# Patient Record
Sex: FEMALE | Race: WHITE | Employment: FULL TIME | URBAN - METROPOLITAN AREA
[De-identification: names, ages, dates, MRNs, and addresses within clinical notes are randomized per-mention and may not be internally consistent; named-entity substitution may affect disease eponyms.]

---

## 2017-03-02 ENCOUNTER — OFFICE VISIT (OUTPATIENT)
Dept: FAMILY MEDICINE | Facility: CLINIC | Age: 42
End: 2017-03-02
Payer: COMMERCIAL

## 2017-03-02 VITALS
HEART RATE: 64 BPM | DIASTOLIC BLOOD PRESSURE: 76 MMHG | HEIGHT: 63 IN | BODY MASS INDEX: 31.5 KG/M2 | TEMPERATURE: 99.1 F | SYSTOLIC BLOOD PRESSURE: 126 MMHG | WEIGHT: 177.8 LBS

## 2017-03-02 DIAGNOSIS — F43.22 ADJUSTMENT DISORDER WITH ANXIETY: ICD-10-CM

## 2017-03-02 DIAGNOSIS — N89.8 VAGINAL DISCHARGE: ICD-10-CM

## 2017-03-02 DIAGNOSIS — M62.830 BACK MUSCLE SPASM: ICD-10-CM

## 2017-03-02 DIAGNOSIS — Z13.6 CARDIOVASCULAR SCREENING; LDL GOAL LESS THAN 160: ICD-10-CM

## 2017-03-02 DIAGNOSIS — R53.83 FATIGUE, UNSPECIFIED TYPE: ICD-10-CM

## 2017-03-02 DIAGNOSIS — N92.0 EXCESSIVE OR FREQUENT MENSTRUATION: ICD-10-CM

## 2017-03-02 DIAGNOSIS — Z01.419 ENCOUNTER FOR GYNECOLOGICAL EXAMINATION WITHOUT ABNORMAL FINDING: Primary | ICD-10-CM

## 2017-03-02 DIAGNOSIS — E05.90 HYPERTHYROIDISM: ICD-10-CM

## 2017-03-02 LAB
ANION GAP SERPL CALCULATED.3IONS-SCNC: 7 MMOL/L (ref 3–14)
BUN SERPL-MCNC: 15 MG/DL (ref 7–30)
CALCIUM SERPL-MCNC: 8.7 MG/DL (ref 8.5–10.1)
CHLORIDE SERPL-SCNC: 104 MMOL/L (ref 94–109)
CO2 SERPL-SCNC: 27 MMOL/L (ref 20–32)
CREAT SERPL-MCNC: 0.66 MG/DL (ref 0.52–1.04)
GFR SERPL CREATININE-BSD FRML MDRD: NORMAL ML/MIN/1.7M2
GLUCOSE SERPL-MCNC: 83 MG/DL (ref 70–99)
HGB BLD-MCNC: 12.2 G/DL (ref 11.7–15.7)
MICRO REPORT STATUS: NORMAL
POTASSIUM SERPL-SCNC: 4.1 MMOL/L (ref 3.4–5.3)
SODIUM SERPL-SCNC: 138 MMOL/L (ref 133–144)
SPECIMEN SOURCE: NORMAL
TSH SERPL DL<=0.005 MIU/L-ACNC: 1.31 MU/L (ref 0.4–4)
WET PREP SPEC: NORMAL

## 2017-03-02 PROCEDURE — 99396 PREV VISIT EST AGE 40-64: CPT | Performed by: NURSE PRACTITIONER

## 2017-03-02 PROCEDURE — 87624 HPV HI-RISK TYP POOLED RSLT: CPT | Performed by: NURSE PRACTITIONER

## 2017-03-02 PROCEDURE — 85018 HEMOGLOBIN: CPT | Performed by: NURSE PRACTITIONER

## 2017-03-02 PROCEDURE — 80048 BASIC METABOLIC PNL TOTAL CA: CPT | Performed by: NURSE PRACTITIONER

## 2017-03-02 PROCEDURE — 84443 ASSAY THYROID STIM HORMONE: CPT | Performed by: NURSE PRACTITIONER

## 2017-03-02 PROCEDURE — 36415 COLL VENOUS BLD VENIPUNCTURE: CPT | Performed by: NURSE PRACTITIONER

## 2017-03-02 PROCEDURE — 87210 SMEAR WET MOUNT SALINE/INK: CPT | Performed by: NURSE PRACTITIONER

## 2017-03-02 PROCEDURE — G0123 SCREEN CERV/VAG THIN LAYER: HCPCS | Performed by: NURSE PRACTITIONER

## 2017-03-02 RX ORDER — SERTRALINE HYDROCHLORIDE 25 MG/1
25 TABLET, FILM COATED ORAL DAILY
Qty: 90 TABLET | Refills: 1 | Status: CANCELLED | OUTPATIENT
Start: 2017-03-02

## 2017-03-02 RX ORDER — CYCLOBENZAPRINE HCL 10 MG
10 TABLET ORAL
Qty: 30 TABLET | Refills: 1 | Status: SHIPPED | OUTPATIENT
Start: 2017-03-02 | End: 2017-11-20

## 2017-03-02 ASSESSMENT — ANXIETY QUESTIONNAIRES
1. FEELING NERVOUS, ANXIOUS, OR ON EDGE: NOT AT ALL
6. BECOMING EASILY ANNOYED OR IRRITABLE: NOT AT ALL
3. WORRYING TOO MUCH ABOUT DIFFERENT THINGS: SEVERAL DAYS
5. BEING SO RESTLESS THAT IT IS HARD TO SIT STILL: NOT AT ALL
7. FEELING AFRAID AS IF SOMETHING AWFUL MIGHT HAPPEN: NOT AT ALL
2. NOT BEING ABLE TO STOP OR CONTROL WORRYING: NOT AT ALL
GAD7 TOTAL SCORE: 1

## 2017-03-02 ASSESSMENT — PATIENT HEALTH QUESTIONNAIRE - PHQ9: 5. POOR APPETITE OR OVEREATING: NOT AT ALL

## 2017-03-02 NOTE — PATIENT INSTRUCTIONS
Preventive Health Recommendations  Female Ages 40 to 49    Yearly exam:     See your health care provider every year in order to  1. Review health changes.   2. Discuss preventive care.    3. Review your medicines if your doctor prescribed any.      Get a Pap test every three years (unless you have an abnormal result and your provider advises testing more often).      If you get Pap tests with HPV test, you only need to test every 5 years, unless you have an abnormal result. You do not need a Pap test if your uterus was removed (hysterectomy) and you have not had cancer.      You should be tested each year for STDs (sexually transmitted diseases), if you're at risk.       Ask your doctor if you should have a mammogram.      Have a colonoscopy (test for colon cancer) if someone in your family has had colon cancer or polyps before age 50.       Have a cholesterol test every 5 years.       Have a diabetes test (fasting glucose) after age 45. If you are at risk for diabetes, you should have this test every 3 years.    Shots: Get a flu shot each year. Get a tetanus shot every 10 years.     Nutrition:     Eat at least 5 servings of fruits and vegetables each day.    Eat whole-grain bread, whole-wheat pasta and brown rice instead of white grains and rice.    Talk to your provider about Calcium and Vitamin D.     Lifestyle    Exercise at least 150 minutes a week (an average of 30 minutes a day, 5 days a week). This will help you control your weight and prevent disease.    Limit alcohol to one drink per day.    No smoking.     Wear sunscreen to prevent skin cancer.    See your dentist every six months for an exam and cleaning.      Increased your Zoloft to   50 mg    Let me know if that does isn't working for you    Stay well hydrated - urine should be clear.        Exercise   30-60 min daily

## 2017-03-02 NOTE — NURSING NOTE
"Chief Complaint   Patient presents with     Physical       Initial /76 (BP Location: Left arm, Patient Position: Chair, Cuff Size: Adult Regular)  Pulse 64  Temp 99.1  F (37.3  C) (Tympanic)  Ht 5' 3.25\" (1.607 m)  Wt 177 lb 12.8 oz (80.6 kg)  LMP 02/11/2017 (Exact Date)  Breastfeeding? No  BMI 31.25 kg/m2 Estimated body mass index is 31.25 kg/(m^2) as calculated from the following:    Height as of this encounter: 5' 3.25\" (1.607 m).    Weight as of this encounter: 177 lb 12.8 oz (80.6 kg).  Medication Reconciliation: complete     Cherelle Smith CMA (AAMA)      "

## 2017-03-02 NOTE — MR AVS SNAPSHOT
After Visit Summary   3/2/2017    Reymundo Mari Page    MRN: [id number]           Patient Information     Date Of Birth          1975        Visit Information        Provider Department      3/2/2017 3:40 PM Sofie Burgos APRN [facility]        Today's Diagnoses     Encounter for gynecological examination without abnormal finding    -  1    Adjustment disorder with anxiety        Back muscle spasm        Hyperthyroidism        CARDIOVASCULAR SCREENING; LDL GOAL LESS THAN 160        Fatigue, unspecified type        Excessive or frequent menstruation          Care Instructions      Preventive Health Recommendations  Female Ages 40 to 49    Yearly exam:     See your health care provider every year in order to  1. Review health changes.   2. Discuss preventive care.    3. Review your medicines if your doctor prescribed any.      Get a Pap test every three years (unless you have an abnormal result and your provider advises testing more often).      If you get Pap tests with HPV test, you only need to test every 5 years, unless you have an abnormal result. You do not need a Pap test if your uterus was removed (hysterectomy) and you have not had cancer.      You should be tested each year for STDs (sexually transmitted diseases), if you're at risk.       Ask your doctor if you should have a mammogram.      Have a colonoscopy (test for colon cancer) if someone in your family has had colon cancer or polyps before age 50.       Have a cholesterol test every 5 years.       Have a diabetes test (fasting glucose) after age 45. If you are at risk for diabetes, you should have this test every 3 years.    Shots: Get a flu shot each year. Get a tetanus shot every 10 years.     Nutrition:     Eat at least 5 servings of fruits and vegetables each day.    Eat whole-grain bread, whole-wheat pasta and brown rice instead of white grains and rice.    Talk to your provider about  "Calcium and Vitamin D.     Lifestyle    Exercise at least 150 minutes a week (an average of 30 minutes a day, 5 days a week). This will help you control your weight and prevent disease.    Limit alcohol to one drink per day.    No smoking.     Wear sunscreen to prevent skin cancer.    See your dentist every six months for an exam and cleaning.      Increased your Zoloft to   50 mg    Let me know if that does isn't working for you    Stay well hydrated - urine should be clear.        Exercise   30-60 min daily             Follow-ups after your visit        Who to contact     Normal or non-critical lab and imaging results will be communicated to you by Fliptuhart, letter or phone within 4 business days after the clinic has received the results. If you do not hear from us within 7 days, please contact the clinic through 3TIER or phone. If you have a critical or abnormal lab result, we will notify you by phone as soon as possible.  Submit refill requests through 3TIER or call your pharmacy and they will forward the refill request to us. Please allow 3 business days for your refill to be completed.          If you need to speak with a  for additional information , please call: [phone]           Additional Information About Your Visit        3TIER Information     3TIER gives you secure access to your electronic health record. If you see a primary care provider, you can also send messages to your care team and make appointments. If you have questions, please call your primary care clinic.  If you do not have a primary care provider, please call [phone] and they will assist you.        Care EveryWhere ID     This is your Care EveryWhere ID. This could be used by other organizations to access your [facility] medical records  [id number]        Your Vitals Were     Pulse Temperature Height Last Period Breastfeeding? BMI (Body Mass Index)    64 99.1  F (37.3  C) (Tympanic) 5' 3.25\" (1.607 m) " 02/11/2017 (Exact Date) No 31.25 kg/m2       Blood Pressure from Last 3 Encounters:   03/02/17 126/76   03/14/16 128/74   02/17/16 126/74    Weight from Last 3 Encounters:   03/02/17 177 lb 12.8 oz (80.6 kg)   03/14/16 156 lb 6.4 oz (70.9 kg)   02/17/16 152 lb (68.9 kg)              We Performed the Following     Basic metabolic panel  (Ca, Cl, CO2, Creat, Gluc, K, Na, BUN)     GLUCOSE     Hemoglobin     HPV High Risk Types DNA Cervical     Pap imaged thin layer screen with HPV - recommended age 30 - 65     TSH with free T4 reflex          Today's Medication Changes          These changes are accurate as of: 3/2/17  4:21 PM.  If you have any questions, ask your nurse or doctor.               These medicines have changed or have updated prescriptions.        Dose/Directions    sertraline 50 MG tablet   Commonly known as:  ZOLOFT   This may have changed:    - medication strength  - how much to take  - additional instructions   Used for:  Adjustment disorder with anxiety   Changed by:  Sofie Burgos APRN CNP        Dose:  50 mg   Take 1 tablet (50 mg) by mouth daily   Quantity:  90 tablet   Refills:  1            Where to get your medicines      These medications were sent to [facility] [id number] [facility] [place] - [address]  [address] [place] [address]     Phone:  [phone]     cyclobenzaprine 10 MG tablet    sertraline 50 MG tablet                Primary Care Provider Office Phone # Fax #    MELANY Gonzalez [facility] [phone]       [facility] [address] [place] [address]        Thank you!     Thank you for choosing [facility]  for your care. Our goal is always to provide you with excellent care. Hearing back from our patients is one way we can continue to improve our services. Please take a few minutes to complete the written survey that you may receive in the mail after your visit with us. Thank you!             Your Updated  Medication List - Protect others around you: Learn how to safely use, store and throw away your medicines at www.disposemymeds.org.          This list is accurate as of: 3/2/17  4:21 PM.  Always use your most recent med list.                   Brand Name Dispense Instructions for use    cyclobenzaprine 10 MG tablet    FLEXERIL    30 tablet    Take 1 tablet (10 mg) by mouth nightly as needed for muscle spasms       ibuprofen 200 MG tablet    ADVIL/MOTRIN     Take 200 mg by mouth Reported on 3/2/2017       MULTIVITAMIN ADULT PO          sertraline 50 MG tablet    ZOLOFT    90 tablet    Take 1 tablet (50 mg) by mouth daily       VITAMIN D (CHOLECALCIFEROL) PO      Take 2,000 Units by mouth daily

## 2017-03-02 NOTE — PROGRESS NOTES
SUBJECTIVE:     CC: Reymundo Joseph is an 41 year old woman who presents for preventive health visit.   Answers for HPI/ROS submitted by the patient on 2/27/2017   Annual Exam:  Getting at least 3 servings of Calcium per day:: Yes  Bi-annual eye exam:: Yes  Dental care twice a year:: Yes  Sleep apnea or symptoms of sleep apnea:: None  Diet:: Regular (no restrictions)  Frequency of exercise:: 2-3 days/week  Taking medications regularly:: Yes  Medication side effects:: None  Additional concerns today:: No  PHQ-2 Depressed: Not at all, Not at all  PHQ-2 Score: 0  Duration of exercise:: 15-30 minutes    *No health care concerns.    Today's PHQ-2 Score:   PHQ-2 ( 1999 [employer]) 2/27/2017 2/17/2016   Q1: Little interest or pleasure in doing things - 0   Q2: Feeling down, depressed or hopeless - 0   PHQ-2 Score - 0   Little interest or pleasure in doing things Not at all -   Feeling down, depressed or hopeless Not at all -   PHQ-2 Score 0 -     Abuse: Current or Past(Physical, Sexual or Emotional)- No  Do you feel safe in your environment - Yes    Social History   Substance Use Topics     Smoking status: Former Smoker     Packs/day: 0.50     Types: Cigarettes     Quit date: 5/18/2006     Smokeless tobacco: Former User     Alcohol use 1.5 - 2.0 oz/week     The patient does not drink >3 drinks per day nor >7 drinks per week.    Recent Labs   Lab Test  02/09/15   0753  06/05/12   0853   CHOL  165  154   HDL  66  53   LDL  86  91   TRIG  64  49   CHOLHDLRATIO  2.5  3.0       Reviewed orders with patient.  Reviewed health maintenance and updated orders accordingly - Yes    Mammo Decision Support:  Patient under age 50, mutual decision reflected in health maintenance.      Pertinent mammograms are reviewed under the imaging tab.  History of abnormal Pap smear: NO - age 30- 65 PAP every 3 years recommended    Reviewed and updated as needed this visit by clinical staff  Tobacco  Allergies  Meds  Med Hx  Surg Hx  Fam  Hx  Soc Hx        Reviewed and updated as needed this visit by Provider      would like to increase the zoloft to 50 mg        ROS:  C: NEGATIVE for fever, chills, change in weight  I: NEGATIVE for worrisome rashes, moles or lesions  E: NEGATIVE for vision changes or irritation  ENT: NEGATIVE for ear, mouth and throat problems  R: NEGATIVE for significant cough or SOB  B: NEGATIVE for masses, tenderness or discharge  CV: NEGATIVE for chest pain, palpitations or peripheral edema  GI: NEGATIVE for nausea, abdominal pain, heartburn, or change in bowel habits  : NEGATIVE for unusual urinary or vaginal symptoms. Periods are regular.  M: NEGATIVE for significant arthralgias or myalgia  N: NEGATIVE for weakness, dizziness or paresthesias  E: NEGATIVE for temperature intolerance, skin/hair changes  H: NEGATIVE for bleeding problems  PSYCHIATRIC: POSITIVE for anxiety is pretty well controlled     Labs reviewed in EPIC  BP Readings from Last 3 Encounters:   17 126/76   16 128/74   16 126/74    Wt Readings from Last 3 Encounters:   17 177 lb 12.8 oz (80.6 kg)   16 156 lb 6.4 oz (70.9 kg)   16 152 lb (68.9 kg)                  Patient Active Problem List   Diagnosis     Hyperthyroidism     CARDIOVASCULAR SCREENING; LDL GOAL LESS THAN 160     ASC-H on pap smear     Family history of other condition     Adjustment disorder with anxiety     24 hour contact info given     Over weight     Vitamin D deficiency     Past Surgical History   Procedure Laterality Date     C nonspecific procedure  1984     Tonsillectomy      section  2011     Procedure: SECTION; Surgeon:CHIKSI CLINE; Location: L+D       Social History   Substance Use Topics     Smoking status: Former Smoker     Packs/day: 0.50     Types: Cigarettes     Quit date: 2006     Smokeless tobacco: Former User     Alcohol use 1.5 - 2.0 oz/week     Family History   Problem Relation Age of Onset      "Neurologic Disorder Mother      migraines, headaches     Thyroid Disease Mother      Hypertension Mother      Lipids Mother      +     Thyroid Disease Maternal Grandmother      CANCER Maternal Grandmother      Psychotic Disorder Maternal Grandmother      Depression Maternal Grandmother      HEART DISEASE Maternal Grandfather      Hypertension Maternal Grandfather      Thyroid Disease Sister      OSTEOPOROSIS Sister          Current Outpatient Prescriptions   Medication Sig Dispense Refill     cyclobenzaprine (FLEXERIL) 10 MG tablet Take 1 tablet (10 mg) by mouth nightly as needed for muscle spasms 30 tablet 1     sertraline (ZOLOFT) 50 MG tablet Take 1 tablet (50 mg) by mouth daily 90 tablet 1     Multiple Vitamins-Minerals (MULTIVITAMIN ADULT PO)        VITAMIN D, CHOLECALCIFEROL, PO Take 2,000 Units by mouth daily       ibuprofen (ADVIL,MOTRIN) 200 MG tablet Take 200 mg by mouth Reported on 3/2/2017       [DISCONTINUED] sertraline (ZOLOFT) 25 MG tablet Take 1 tablet (25 mg) by mouth daily DUE ffor  office f/u in FEB 2017 90 tablet 1     Allergies   Allergen Reactions     No Known Drug Allergies      OBJECTIVE:     /76 (BP Location: Left arm, Patient Position: Chair, Cuff Size: Adult Regular)  Pulse 64  Temp 99.1  F (37.3  C) (Tympanic)  Ht 5' 3.25\" (1.607 m)  Wt 177 lb 12.8 oz (80.6 kg)  LMP 02/11/2017 (Exact Date)  Breastfeeding? No  BMI 31.25 kg/m2  EXAM:  GENERAL: healthy, alert and no distress  EYES: Eyes grossly normal to inspection, PERRL and conjunctivae and sclerae normal  HENT: ear canals and TM's normal, nose and mouth without ulcers or lesions  NECK: no adenopathy, no asymmetry, masses, or scars and thyroid normal to palpation  RESP: lungs clear to auscultation - no rales, rhonchi or wheezes  BREAST: normal without masses, tenderness or nipple discharge and no palpable axillary masses or adenopathy  CV: regular rate and rhythm, normal S1 S2, no S3 or S4, no murmur, click or rub, no peripheral " edema and peripheral pulses strong  ABDOMEN: soft, nontender, no hepatosplenomegaly, no masses and bowel sounds normal   (female): normal female external genitalia, normal urethral meatus, vaginal mucosa pink, moist, well rugated, and normal cervix/adnexa/uterus without masses or discharge   (female): normal female external genitalia, normal urethral meatus , vaginal mucosa pink, moist, well rugated, normal cervix, adnexae, and uterus without masses. and does have a lot of  Mucous   MS: no gross musculoskeletal defects noted, no edema  SKIN: no suspicious lesions or rashes  NEURO: Normal strength and tone, mentation intact and speech normal  PSYCH: mentation appears normal, affect normal/bright  LYMPH: no cervical, supraclavicular, axillary, or inguinal adenopathy    ASSESSMENT/PLAN:       ASSESSMENT/PLAN:      ICD-10-CM    1. Encounter for gynecological examination without abnormal finding Z01.419 Pap imaged thin layer screen with HPV - recommended age 30 - 65     HPV High Risk Types DNA Cervical     Basic metabolic panel  (Ca, Cl, CO2, Creat, Gluc, K, Na, BUN)     CANCELED: GLUCOSE   2. Adjustment disorder with anxiety F43.22 sertraline (ZOLOFT) 50 MG tablet   3. Back muscle spasm M62.830 cyclobenzaprine (FLEXERIL) 10 MG tablet   4. Hyperthyroidism E05.90 TSH with free T4 reflex   5. CARDIOVASCULAR SCREENING; LDL GOAL LESS THAN 160 Z13.6    6. Fatigue, unspecified type R53.83 Basic metabolic panel  (Ca, Cl, CO2, Creat, Gluc, K, Na, BUN)   7. Excessive or frequent menstruation N92.0 Hemoglobin   8. Vaginal discharge N89.8 Wet prep       Patient Instructions     Preventive Health Recommendations  Female Ages 40 to 49    Yearly exam:     See your health care provider every year in order to  1. Review health changes.   2. Discuss preventive care.    3. Review your medicines if your doctor prescribed any.      Get a Pap test every three years (unless you have an abnormal result and your provider advises testing  "more often).      If you get Pap tests with HPV test, you only need to test every 5 years, unless you have an abnormal result. You do not need a Pap test if your uterus was removed (hysterectomy) and you have not had cancer.      You should be tested each year for STDs (sexually transmitted diseases), if you're at risk.       Ask your doctor if you should have a mammogram.      Have a colonoscopy (test for colon cancer) if someone in your family has had colon cancer or polyps before age 50.       Have a cholesterol test every 5 years.       Have a diabetes test (fasting glucose) after age 45. If you are at risk for diabetes, you should have this test every 3 years.    Shots: Get a flu shot each year. Get a tetanus shot every 10 years.     Nutrition:     Eat at least 5 servings of fruits and vegetables each day.    Eat whole-grain bread, whole-wheat pasta and brown rice instead of white grains and rice.    Talk to your provider about Calcium and Vitamin D.     Lifestyle    Exercise at least 150 minutes a week (an average of 30 minutes a day, 5 days a week). This will help you control your weight and prevent disease.    Limit alcohol to one drink per day.    No smoking.     Wear sunscreen to prevent skin cancer.    See your dentist every six months for an exam and cleaning.      Increased your Zoloft to   50 mg    Let me know if that does isn't working for you    Stay well hydrated - urine should be clear.        Exercise   30-60 min daily                 COUNSELING:   Reviewed preventive health counseling, as reflected in patient instructions       Regular exercise       Healthy diet/nutrition       Vision screening         reports that she quit smoking about 10 years ago. Her smoking use included Cigarettes. She smoked 0.50 packs per day. She has quit using smokeless tobacco.    Estimated body mass index is 31.25 kg/(m^2) as calculated from the following:    Height as of this encounter: 5' 3.25\" (1.607 m).    Weight " as of this encounter: 177 lb 12.8 oz (80.6 kg).   Weight management plan: Discussed healthy diet and exercise guidelines and patient will follow up in 6 months in clinic to re-evaluate.    Counseling Resources:  ATP IV Guidelines  Pooled Cohorts Equation Calculator  Breast Cancer Risk Calculator  FRAX Risk Assessment  ICSI Preventive Guidelines  Dietary Guidelines for Americans, 2010  USDA's MyPlate  ASA Prophylaxis  Lung CA Screening    HOSSEIN RODRIGUEZ NP, APRN CNP  [facility]

## 2017-03-03 ASSESSMENT — PATIENT HEALTH QUESTIONNAIRE - PHQ9: SUM OF ALL RESPONSES TO PHQ QUESTIONS 1-9: 3

## 2017-03-03 ASSESSMENT — ANXIETY QUESTIONNAIRES: GAD7 TOTAL SCORE: 1

## 2017-03-07 LAB
COPATH REPORT: NORMAL
PAP: NORMAL

## 2017-03-08 LAB
FINAL DIAGNOSIS: NORMAL
HPV HR 12 DNA CVX QL NAA+PROBE: NEGATIVE
HPV16 DNA SPEC QL NAA+PROBE: NEGATIVE
HPV18 DNA SPEC QL NAA+PROBE: NEGATIVE
SPECIMEN DESCRIPTION: NORMAL

## 2017-08-30 DIAGNOSIS — F43.22 ADJUSTMENT DISORDER WITH ANXIETY: ICD-10-CM

## 2017-09-27 ENCOUNTER — ALLIED HEALTH/NURSE VISIT (OUTPATIENT)
Dept: NURSING | Facility: CLINIC | Age: 42
End: 2017-09-27
Payer: COMMERCIAL

## 2017-09-27 DIAGNOSIS — Z23 NEED FOR PROPHYLACTIC VACCINATION AND INOCULATION AGAINST INFLUENZA: Primary | ICD-10-CM

## 2017-09-27 PROCEDURE — 90686 IIV4 VACC NO PRSV 0.5 ML IM: CPT

## 2017-09-27 PROCEDURE — 99207 ZZC NO CHARGE NURSE ONLY: CPT

## 2017-09-27 PROCEDURE — 90471 IMMUNIZATION ADMIN: CPT

## 2017-09-27 NOTE — MR AVS SNAPSHOT
After Visit Summary   9/27/2017    Reymundo Mari Page    MRN: [id number]           Patient Information     Date Of Birth          1975        Visit Information        Provider Department      9/27/2017 3:00 PM CARE COORDINATOR [facility]        Today's Diagnoses     Need for prophylactic vaccination and inoculation against influenza    -  1       Follow-ups after your visit        Who to contact     If you have questions or need follow up information about today's clinic visit or your schedule please contact [facility] directly at [phone].  Normal or non-critical lab and imaging results will be communicated to you by Amity Manufacturinghart, letter or phone within 4 business days after the clinic has received the results. If you do not hear from us within 7 days, please contact the clinic through Ecochlort or phone. If you have a critical or abnormal lab result, we will notify you by phone as soon as possible.  Submit refill requests through Electronifie or call your pharmacy and they will forward the refill request to us. Please allow 3 business days for your refill to be completed.          Additional Information About Your Visit        MyChart Information     Electronifie gives you secure access to your electronic health record. If you see a primary care provider, you can also send messages to your care team and make appointments. If you have questions, please call your primary care clinic.  If you do not have a primary care provider, please call [phone] and they will assist you.        Care EveryWhere ID     This is your Care EveryWhere ID. This could be used by other organizations to access your [facility] medical records  [id number]         Blood Pressure from Last 3 Encounters:   03/02/17 126/76   03/14/16 128/74   02/17/16 126/74    Weight from Last 3 Encounters:   03/02/17 177 lb 12.8 oz (80.6 kg)   03/14/16 156 lb 6.4 oz (70.9 kg)   02/17/16  152 lb (68.9 kg)              We Performed the Following     FLU VAC, SPLIT VIRUS IM > 3 YO (QUADRIVALENT) [34318]     Vaccine Administration, Initial [[id number]]        Primary Care Provider Office Phone # Fax #    SofieMELANY Trevino [facility] [phone] [address] [place] [address]        Equal Access to Services     [facility]: Hadii aad ku hadasho Soomaali, waaxda luqadaha, qaybta kaalmada adeegyada, waxay idiin hayaan adeeg kharash la'aan ah. So [facility] [phone].    ATENCIÓN: Si habla español, tiene a caldwell disposición servicios gratuitos de asistencia lingüística. [facility] [phone].    We comply with applicable federal civil rights laws and [place] laws. We do not discriminate on the basis of race, color, national origin, age, disability sex, sexual orientation or gender identity.            Thank you!     Thank you for choosing [facility]  for your care. Our goal is always to provide you with excellent care. Hearing back from our patients is one way we can continue to improve our services. Please take a few minutes to complete the written survey that you may receive in the mail after your visit with us. Thank you!             Your Updated Medication List - Protect others around you: Learn how to safely use, store and throw away your medicines at www.disposemymeds.org.          This list is accurate as of: 9/27/17  4:19 PM.  Always use your most recent med list.                   Brand Name Dispense Instructions for use Diagnosis    cyclobenzaprine 10 MG tablet    FLEXERIL    30 tablet    Take 1 tablet (10 mg) by mouth nightly as needed for muscle spasms    Back muscle spasm       ibuprofen 200 MG tablet    ADVIL/MOTRIN     Take 200 mg by mouth Reported on 3/2/2017        MULTIVITAMIN ADULT PO           sertraline 50 MG tablet    ZOLOFT    90 tablet    TAKE ONE TABLET BY MOUTH ONCE DAILY    Adjustment disorder with anxiety       VITAMIN D  (CHOLECALCIFEROL) PO      Take 2,000 Units by mouth daily

## 2017-11-02 NOTE — PROGRESS NOTES
Injectable Influenza Immunization Documentation    1.  Is the person to be vaccinated sick today?   No    2. Does the person to be vaccinated have an allergy to a component   of the vaccine?   No    3. Has the person to be vaccinated ever had a serious reaction   to influenza vaccine in the past?   No    4. Has the person to be vaccinated ever had Guillain-Barré syndrome?   No    Form completed by self          <<-----Click on this checkbox to enter Pre-Op Dx

## 2017-11-20 DIAGNOSIS — M62.830 BACK MUSCLE SPASM: ICD-10-CM

## 2017-11-20 RX ORDER — CYCLOBENZAPRINE HCL 10 MG
10 TABLET ORAL
Qty: 30 TABLET | Refills: 1 | Status: SHIPPED | OUTPATIENT
Start: 2017-11-20

## 2017-11-20 NOTE — TELEPHONE ENCOUNTER
Cyclobenzaprine 10mg      Last Written Prescription Date: 03/02/2017 #30 x 1  Last filled 10/25/2017  Last Office Visit with [facility], [facility] or [facility] prescribing provider: 03/02/2017 PORSCHE Burgos

## 2017-11-22 ENCOUNTER — TELEPHONE (OUTPATIENT)
Dept: FAMILY MEDICINE | Facility: CLINIC | Age: 42
End: 2017-11-22

## 2018-06-08 DIAGNOSIS — F43.22 ADJUSTMENT DISORDER WITH ANXIETY: ICD-10-CM

## 2018-06-08 NOTE — TELEPHONE ENCOUNTER
"Requested Prescriptions   Pending Prescriptions Disp Refills     sertraline (ZOLOFT) 50 MG tablet [Pharmacy Med Name: SERTRALINE HCL 50 MG TABLET] 90 tablet 2    Last Written Prescription Date:  08/31/2017 #90 x 2  Last filled 03/04/2018  Last office visit: 3/2/2017 PORSCHE Burgos   Future Office Visit:  None   Sig: TAKE ONE TABLET BY MOUTH ONCE DAILY    SSRIs Protocol Failed    6/8/2018  3:24 AM  PHQ-9 SCORE 2/9/2015 2/17/2016 3/2/2017   Total Score 2 - -   Total Score - 0 3       EILEEN-7 SCORE 2/6/2014 2/9/2015 3/2/2017   Total Score 5 0 -   Total Score - - 1              Failed - Recent (12 mo) or future (30 days) visit within the authorizing provider's specialty    Patient had office visit in the last 12 months or has a visit in the next 30 days with authorizing provider or within the authorizing provider's specialty.  See \"Patient Info\" tab in inbasket, or \"Choose Columns\" in Meds & Orders section of the refill encounter.           Passed - Patient is age 18 or older       Passed - No active pregnancy on record       Passed - No positive pregnancy test in last 12 months          "

## 2018-07-18 DIAGNOSIS — F43.22 ADJUSTMENT DISORDER WITH ANXIETY: ICD-10-CM

## 2018-07-18 NOTE — TELEPHONE ENCOUNTER
"Requested Prescriptions   Pending Prescriptions Disp Refills     sertraline (ZOLOFT) 50 MG tablet [Pharmacy Med Name: SERTRALINE HCL 50 MG TABLET] 30 tablet 0    Last Written Prescription Date:  6/8/18  Last Fill Quantity: 30,  # refills: 0   Last office visit: 3/2/2017 with prescribing provider:  3/2/17   Future Office Visit:     Sig: TAKE 1 TABLET BY MOUTH ONCE DAILY (NEEDS A FOLLOW-UP APPOINTMENT FOR THIS MEDICATION)    SSRIs Protocol Failed    7/18/2018  3:20 PM       Failed - Recent (12 mo) or future (30 days) visit within the authorizing provider's specialty    Patient had office visit in the last 12 months or has a visit in the next 30 days with authorizing provider or within the authorizing provider's specialty.  See \"Patient Info\" tab in inbasket, or \"Choose Columns\" in Meds & Orders section of the refill encounter.           Passed - Patient is age 18 or older       Passed - No active pregnancy on record       Passed - No positive pregnancy test in last 12 months          "

## 2018-07-18 NOTE — TELEPHONE ENCOUNTER
"Requested Prescriptions   Pending Prescriptions Disp Refills     sertraline (ZOLOFT) 50 MG tablet [Pharmacy Med Name: SERTRALINE HCL 50 MG TABLET] 30 tablet 0    Last Written Prescription Date:  6/5/18  Last Fill Quantity: 30,  # refills: 0   Last office visit: 3/2/2017 with prescribing provider:  3/2/17 rowena   Future Office Visit:     Sig: TAKE 1 TABLET BY MOUTH ONCE DAILY (NEEDS A FOLLOW-UP APPOINTMENT FOR THIS MEDICATION)    SSRIs Protocol Failed    7/18/2018  1:30 AM       Failed - Recent (12 mo) or future (30 days) visit within the authorizing provider's specialty    Patient had office visit in the last 12 months or has a visit in the next 30 days with authorizing provider or within the authorizing provider's specialty.  See \"Patient Info\" tab in inbasket, or \"Choose Columns\" in Meds & Orders section of the refill encounter.           Passed - Patient is age 18 or older       Passed - No active pregnancy on record       Passed - No positive pregnancy test in last 12 months          "

## 2018-07-18 NOTE — TELEPHONE ENCOUNTER
"Spoke with pt and informed her she is due for Physical (last 3/2/17).  Says she has a few weeks left of the sertraline. \"I'll set up an appointment before I run out.\"    Linette LEMONS RN      "

## 2018-07-19 NOTE — TELEPHONE ENCOUNTER
Patient was called, she does not need this medication at this time.  She states she has enough, and is aware of the need to schedule a follow up appointment.    Routing refill request to provider for review/approval because:  Patient needs to be seen because it has been more than 1 year since last office visit.      Ana Krishnan RN

## 2018-10-04 ENCOUNTER — OFFICE VISIT (OUTPATIENT)
Dept: FAMILY MEDICINE | Facility: CLINIC | Age: 43
End: 2018-10-04
Payer: COMMERCIAL

## 2018-10-04 VITALS
RESPIRATION RATE: 14 BRPM | TEMPERATURE: 98 F | WEIGHT: 172 LBS | HEIGHT: 63 IN | HEART RATE: 68 BPM | DIASTOLIC BLOOD PRESSURE: 74 MMHG | SYSTOLIC BLOOD PRESSURE: 116 MMHG | BODY MASS INDEX: 30.48 KG/M2

## 2018-10-04 DIAGNOSIS — E55.9 VITAMIN D DEFICIENCY: ICD-10-CM

## 2018-10-04 DIAGNOSIS — E05.90 HYPERTHYROIDISM: ICD-10-CM

## 2018-10-04 DIAGNOSIS — Z13.6 CARDIOVASCULAR SCREENING; LDL GOAL LESS THAN 160: ICD-10-CM

## 2018-10-04 DIAGNOSIS — Z00.00 ROUTINE GENERAL MEDICAL EXAMINATION AT A HEALTH CARE FACILITY: Primary | ICD-10-CM

## 2018-10-04 DIAGNOSIS — Z23 ENCOUNTER FOR IMMUNIZATION: ICD-10-CM

## 2018-10-04 DIAGNOSIS — Z23 NEED FOR PROPHYLACTIC VACCINATION AND INOCULATION AGAINST INFLUENZA: ICD-10-CM

## 2018-10-04 PROCEDURE — 90472 IMMUNIZATION ADMIN EACH ADD: CPT | Performed by: NURSE PRACTITIONER

## 2018-10-04 PROCEDURE — 90715 TDAP VACCINE 7 YRS/> IM: CPT | Performed by: NURSE PRACTITIONER

## 2018-10-04 PROCEDURE — 90686 IIV4 VACC NO PRSV 0.5 ML IM: CPT | Performed by: NURSE PRACTITIONER

## 2018-10-04 PROCEDURE — 90471 IMMUNIZATION ADMIN: CPT | Performed by: NURSE PRACTITIONER

## 2018-10-04 PROCEDURE — 99396 PREV VISIT EST AGE 40-64: CPT | Mod: 25 | Performed by: NURSE PRACTITIONER

## 2018-10-04 ASSESSMENT — PAIN SCALES - GENERAL: PAINLEVEL: NO PAIN (0)

## 2018-10-04 NOTE — NURSING NOTE
"Initial /74 (BP Location: Right arm, Patient Position: Chair, Cuff Size: Adult Large)  Pulse 68  Temp 98  F (36.7  C) (Oral)  Resp 14  Ht 5' 3.39\" (1.61 m)  Wt 172 lb (78 kg)  LMP 09/27/2018 (Exact Date)  Breastfeeding? No  BMI 30.1 kg/m2 Estimated body mass index is 30.1 kg/(m^2) as calculated from the following:    Height as of this encounter: 5' 3.39\" (1.61 m).    Weight as of this encounter: 172 lb (78 kg). .    Cherelle Bermudez CMA ([facility])    "

## 2018-10-04 NOTE — PATIENT INSTRUCTIONS
Preventive Health Recommendations  Female Ages 40 to 49    Yearly exam:     See your health care provider every year in order to  1. Review health changes.   2. Discuss preventive care.    3. Review your medicines if your doctor prescribed any.      Get a Pap test every three years (unless you have an abnormal result and your provider advises testing more often).      If you get Pap tests with HPV test, you only need to test every 5 years, unless you have an abnormal result. You do not need a Pap test if your uterus was removed (hysterectomy) and you have not had cancer.      You should be tested each year for STDs (sexually transmitted diseases), if you're at risk.     Ask your doctor if you should have a mammogram.      Have a colonoscopy (test for colon cancer) if someone in your family has had colon cancer or polyps before age 50.       Have a cholesterol test every 5 years.       Have a diabetes test (fasting glucose) after age 45. If you are at risk for diabetes, you should have this test every 3 years.    Shots: Get a flu shot each year. Get a tetanus shot every 10 years.     Nutrition:     Eat at least 5 servings of fruits and vegetables each day.    Eat whole-grain bread, whole-wheat pasta and brown rice instead of white grains and rice.    Get adequate Calcium and Vitamin D.      Lifestyle    Exercise at least 150 minutes a week (an average of 30 minutes a day, 5 days a week). This will help you control your weight and prevent disease.    Limit alcohol to one drink per day.    No smoking.     Wear sunscreen to prevent skin cancer.    See your dentist every six months for an exam and cleaning.        You will need to make a lab only appointment,  [phone].  Or at your work,     You are doing great.     \

## 2018-10-04 NOTE — PROGRESS NOTES

## 2018-10-04 NOTE — MR AVS SNAPSHOT
After Visit Summary   10/4/2018    Reymundo Mari Page    MRN: [id number]           Patient Information     Date Of Birth          1975        Visit Information        Provider Department      10/4/2018 11:20 AM Sofie Burgos APRN [facility] [address]        Today's Diagnoses     Routine general medical examination at a health care facility    -  1    Need for prophylactic vaccination and inoculation against influenza        Encounter for immunization          Care Instructions      Preventive Health Recommendations  Female Ages 40 to 49    Yearly exam:     See your health care provider every year in order to  1. Review health changes.   2. Discuss preventive care.    3. Review your medicines if your doctor prescribed any.      Get a Pap test every three years (unless you have an abnormal result and your provider advises testing more often).      If you get Pap tests with HPV test, you only need to test every 5 years, unless you have an abnormal result. You do not need a Pap test if your uterus was removed (hysterectomy) and you have not had cancer.      You should be tested each year for STDs (sexually transmitted diseases), if you're at risk.     Ask your doctor if you should have a mammogram.      Have a colonoscopy (test for colon cancer) if someone in your family has had colon cancer or polyps before age 50.       Have a cholesterol test every 5 years.       Have a diabetes test (fasting glucose) after age 45. If you are at risk for diabetes, you should have this test every 3 years.    Shots: Get a flu shot each year. Get a tetanus shot every 10 years.     Nutrition:     Eat at least 5 servings of fruits and vegetables each day.    Eat whole-grain bread, whole-wheat pasta and brown rice instead of white grains and rice.    Get adequate Calcium and Vitamin D.      Lifestyle    Exercise at least 150 minutes a week (an average of 30 minutes a day, 5 days a week). This  "will help you control your weight and prevent disease.    Limit alcohol to one drink per day.    No smoking.     Wear sunscreen to prevent skin cancer.    See your dentist every six months for an exam and cleaning.        You will need to make a lab only appointment,  [phone].  Or at your work,     You are doing great.     \          Follow-ups after your visit        Follow-up notes from your care team     Return in about 1 year (around 10/4/2019) for Routine Visit.      Who to contact     Normal or non-critical lab and imaging results will be communicated to you by [employer], letter or phone within 4 business days after the clinic has received the results. If you do not hear from us within 7 days, please contact the clinic through [employer] or phone. If you have a critical or abnormal lab result, we will notify you by phone as soon as possible.  Submit refill requests through [employer] or call your pharmacy and they will forward the refill request to us. Please allow 3 business days for your refill to be completed.          If you need to speak with a  for additional information , please call: [phone]           Additional Information About Your Visit        [employer] Information     [employer] gives you secure access to your electronic health record. If you see a primary care provider, you can also send messages to your care team and make appointments. If you have questions, please call your primary care clinic.  If you do not have a primary care provider, please call [phone] and they will assist you.        Care EveryWhere ID     This is your Care EveryWhere ID. This could be used by other organizations to access your [facility] medical records  [id number]        Your Vitals Were     Pulse Temperature Respirations Height Last Period Breastfeeding?    68 98  F (36.7  C) (Oral) 14 5' 3.39\" (1.61 m) 09/27/2018 (Exact Date) No    BMI (Body Mass Index)                   30.1 kg/m2            Blood " Pressure from Last 3 Encounters:   10/04/18 116/74   03/02/17 126/76   03/14/16 128/74    Weight from Last 3 Encounters:   10/04/18 172 lb (78 kg)   03/02/17 177 lb 12.8 oz (80.6 kg)   03/14/16 156 lb 6.4 oz (70.9 kg)              We Performed the Following     EA ADD'L VACCINE     FLU VACCINE, SPLIT VIRUS, IM (QUADRIVALENT) [87524]- >3 YRS     TDAP VACCINE (ADACEL)     Vaccine Administration, Initial [29060]          Today's Medication Changes          These changes are accurate as of 10/4/18 12:37 PM.  If you have any questions, ask your nurse or doctor.               Stop taking these medicines if you haven't already. Please contact your care team if you have questions.     sertraline 50 MG tablet   Commonly known as:  ZOLOFT   Stopped by:  Sofie Burgos APRN CNP                    Primary Care Provider Office Phone # Fax #    MELANY Gonzalez [phone] [address] [place] [address]        Equal Access to Services     [facility] : Hadii matthew case hadasho Solex, waaxda luqadaha, qaybta kaalmada adececy, jannie hill . So [facility] [phone].    ATENCIÓN: Si habla español, tiene a caldwell disposición servicios gratuitos de asistencia lingüística. [facility] [phone].    We comply with applicable federal civil rights laws and [place] laws. We do not discriminate on the basis of race, color, national origin, age, disability, sex, sexual orientation, or gender identity.            Thank you!     Thank you for choosing [facility] [place]  for your care. Our goal is always to provide you with excellent care. Hearing back from our patients is one way we can continue to improve our services. Please take a few minutes to complete the written survey that you may receive in the mail after your visit with us. Thank you!             Your Updated Medication List - Protect others around you: Learn how to safely use, store and throw away  your medicines at www.disposemymeds.org.          This list is accurate as of 10/4/18 12:37 PM.  Always use your most recent med list.                   Brand Name Dispense Instructions for use Diagnosis    cyclobenzaprine 10 MG tablet    FLEXERIL    30 tablet    Take 1 tablet (10 mg) by mouth nightly as needed for muscle spasms Take ONLY if needed    Back muscle spasm       FISH OIL PO      Take 2,000 mg by mouth        ibuprofen 200 MG tablet    ADVIL/MOTRIN     Take 200 mg by mouth Reported on 3/2/2017        MULTIVITAMIN ADULT PO           VITAMIN D (CHOLECALCIFEROL) PO      Take 2,000 Units by mouth daily

## 2018-10-04 NOTE — LETTER
[facility]  [address] [address]  [phone]        October 9, 2019    Reymundo Mari Page  [address]  [place] [address]              Dear Reymundo Mari Page    This is to remind you that your fasting lab is due.    You may call our office at [phone] to schedule an appointment.    Please disregard this notice if you have already had your labs drawn or made an appointment.        Sincerely,        Sofie Burgos RN, CNP

## 2018-10-04 NOTE — PROGRESS NOTES
"   SUBJECTIVE:   CC: Reymundo Joseph is an 42 year old woman who presents for preventive health visit.     Healthy Habits:    Do you get at least three servings of calcium containing foods daily (dairy, green leafy vegetables, etc.)? yes    Amount of exercise or daily activities, outside of work: 6 day(s) per week- bikes, runs    Problems taking medications regularly: not applicable    Medication side effects: No    Have you had an eye exam in the past two years? Yes - wears glasses/contacts    Do you see a dentist twice per year? yes    Do you have sleep apnea, excessive snoring or daytime drowsiness? no    *Is not fasting.    Here today for preventive visit. Does not have any concerns. Reports she did a cleanse in august and lost 17 pounds and has since lost 6.5 more. Weaned herself off her sertraline and reports her moods are good. Has some \"anger\" when driving but is not concerned.     Today's PHQ-2 Score:   PHQ-2 ( 1999 [employer]) 10/4/2018 3/2/2017   Q1: Little interest or pleasure in doing things 0 0   Q2: Feeling down, depressed or hopeless 0 0   PHQ-2 Score 0 0   Q1: Little interest or pleasure in doing things - -   Q2: Feeling down, depressed or hopeless - -   PHQ-2 Score - -     Abuse: Current or Past(Physical, Sexual or Emotional)- No  Do you feel safe in your environment - Yes    Social History   Substance Use Topics     Smoking status: Former Smoker     Packs/day: 0.50     Types: Cigarettes     Quit date: 5/18/2006     Smokeless tobacco: Former User     Alcohol use 1.5 - 2.0 oz/week     If you drink alcohol do you typically have >3 drinks per day or >7 drinks per week? No                     Reviewed orders with patient.  Reviewed health maintenance and updated orders accordingly - Yes  Labs reviewed in EPIC    Patient under age 50, mutual decision reflected in health maintenance.      Pertinent mammograms are reviewed under the imaging tab- N/A  History of abnormal Pap smear: YES - updated in " Problem List and Health Maintenance accordingly. Most recent was normal, recheck in 3 years ()  PAP / HPV Latest Ref Rng & Units 3/2/2017 2016 2015   PAP - NIL NIL NIL   HPV 16 DNA NEG Negative Negative -   HPV 18 DNA NEG Negative Negative -   OTHER HR HPV NEG Negative Negative -     Reviewed and updated as needed this visit by clinical staff  Tobacco  Allergies  Meds  Med Hx  Surg Hx  Fam Hx  Soc Hx        Reviewed and updated as needed this visit by Provider        Past Medical History:   Diagnosis Date     Abnormal Pap smear, can't excl hi gd sq intraepithelial lesion (ASC-H) 2011    colp - focal atypia     ASCUS favor benign 2014    neg HPV per [facility]P rpt cotesting in 3 yrs. Due 2017     Hyperthyroidism 2009     Lumbar back pain 2010    from injury      Neck pain 2010    from injury      NO ACTIVE PROBLEMS       Past Surgical History:   Procedure Laterality Date     C NONSPECIFIC PROCEDURE      Tonsillectomy      SECTION  2011    Procedure: SECTION; Surgeon:CHIKIS CLINE; Location:[facility] L+D       ROS:  CONSTITUTIONAL: NEGATIVE for fever, chills, change in weight  INTEGUMENTARY/SKIN: NEGATIVE for worrisome rashes, moles or lesions  EYES: NEGATIVE for vision changes or irritation  ENT: NEGATIVE for ear, mouth and throat problems  RESP: NEGATIVE for significant cough or SOB  BREAST: NEGATIVE for masses, tenderness or discharge  CV: NEGATIVE for chest pain, palpitations or peripheral edema  GI: NEGATIVE for nausea, abdominal pain, heartburn, or change in bowel habits  : NEGATIVE for unusual urinary or vaginal symptoms. No vaginal bleeding.  MUSCULOSKELETAL: NEGATIVE for significant arthralgias or myalgia  NEURO: NEGATIVE for weakness, dizziness or paresthesias  ENDOCRINE: NEGATIVE for temperature intolerance, skin/hair changes  PSYCHIATRIC: NEGATIVE for changes in mood or affect- no longer taking sertraline    OBJECTIVE:   /74 (BP Location:  "Right arm, Patient Position: Chair, Cuff Size: Adult Large)  Pulse 68  Temp 98  F (36.7  C) (Oral)  Resp 14  Ht 5' 3.39\" (1.61 m)  Wt 172 lb (78 kg)  LMP 09/27/2018 (Exact Date)  Breastfeeding? No  BMI 30.1 kg/m2  EXAM:  GENERAL: healthy, alert and no distress  EYES: Eyes grossly normal to inspection, PERRL and conjunctivae and sclerae normal  HENT: ear canals and TM's normal, nose and mouth without ulcers or lesions  NECK: no adenopathy, no asymmetry, masses, or scars and thyroid normal to palpation  RESP: lungs clear to auscultation - no rales, rhonchi or wheezes  BREAST: normal without masses, tenderness or nipple discharge and no palpable axillary masses or adenopathy  BREAST: normal without masses, tenderness or nipple discharge, no palpable axillary masses or adenopathy and fibrocystic tissue bilaterally- mobile, nontender   CV: regular rate and rhythm, normal S1 S2, no S3 or S4, no murmur, click or rub, no peripheral edema and peripheral pulses strong  ABDOMEN: soft, nontender, no hepatosplenomegaly, no masses and bowel sounds normal  MS: no gross musculoskeletal defects noted, no edema  SKIN: no suspicious lesions or rashes  NEURO: Normal strength and tone, mentation intact and speech normal  PSYCH: mentation appears normal, affect normal/bright    Diagnostic Test Results: Future labs ordered     ASSESSMENT/PLAN:       ICD-10-CM    1. Routine general medical examination at a health care facility Z00.00 **Basic metabolic panel FUTURE anytime   2. Need for prophylactic vaccination and inoculation against influenza Z23 FLU VACCINE, SPLIT VIRUS, IM (QUADRIVALENT) [96851]- >3 YRS     Vaccine Administration, Initial [66830]   3. Encounter for immunization Z23 TDAP VACCINE (ADACEL)     EA ADD'L VACCINE   4. Hyperthyroidism E05.90 **TSH with free T4 reflex FUTURE anytime   5. Vitamin D deficiency E55.9 **Vitamin D Deficiency FUTURE anytime   6. CARDIOVASCULAR SCREENING; LDL GOAL LESS THAN 160 Z13.6 Lipid " "panel reflex to direct LDL Fasting       COUNSELING:   Reviewed preventive health counseling, as reflected in patient instructions    BP Readings from Last 1 Encounters:   10/04/18 116/74     Estimated body mass index is 30.1 kg/(m^2) as calculated from the following:    Height as of this encounter: 5' 3.39\" (1.61 m).    Weight as of this encounter: 172 lb (78 kg).      Weight management plan: patient has been exercising regularly and has made changes to her diet. Has had recent ~23 lb weight loss     reports that she quit smoking about 12 years ago. Her smoking use included Cigarettes. She smoked 0.50 packs per day. She has quit using smokeless tobacco.      Counseling Resources:  ATP IV Guidelines  Pooled Cohorts Equation Calculator  Breast Cancer Risk Calculator  FRAX Risk Assessment  ICSI Preventive Guidelines  Dietary Guidelines for Americans, 2010  USDA's MyPlate  ASA Prophylaxis  Lung CA Screening    HOSSEIN RODRIGUEZ NP, APRN CNP  [facility]  "

## 2018-11-12 NOTE — TELEPHONE ENCOUNTER
Prescription approved per [facility] Refill Protocol or patient Primary care provider (PCP)  EVE Voss RN/Iker Orantes          
sertraline (ZOLOFT) 50 MG tablet     Last Written Prescription Date: 3/2/17  Last Fill Quantity: 90, # refills: 1  Last Office Visit with G primary care provider:  3/2/17        Last PHQ-9 score on record=   PHQ-9 SCORE 3/2/2017   Total Score -   Total Score 3                 
numerical 0-10

## 2019-08-18 ENCOUNTER — MYC MEDICAL ADVICE (OUTPATIENT)
Dept: FAMILY MEDICINE | Facility: CLINIC | Age: 44
End: 2019-08-18

## 2019-08-18 ENCOUNTER — VIRTUAL VISIT (OUTPATIENT)
Dept: FAMILY MEDICINE | Facility: OTHER | Age: 44
End: 2019-08-18

## 2019-08-19 NOTE — PROGRESS NOTES
"Date:   Clinician: Des Fitch  Clinician NPI: [id number]  Patient: Reymundo Giron  Patient : 1975  Patient Address: [address] [place] [address], [place] [address]  Patient Phone: [phone]  Visit Protocol: Yeast infection  Patient Summary:  Reymundo is a 43 year old ( : 1975 ) female who initiated a Visit for a presumed vaginal yeast infection. When asked the question \"Please sign me up to receive news, health information and promotions from [employer].\", Reymundo responded \"No\".    Reymundo began noticing vaginal burning, vaginal irritation, vaginal discharge, and vaginal pruritus 1-3 days ago.   Symptom details   Vaginal discharge: She has a more than normal amount of white, chunky (like cottage cheese) discharge. The discharge does not have a fishy smell.    She denies having abdominal pain, blisters, and open sores. She also denies feeling feverish.   She has not tried to treat her current symptoms with any medication.   Precipitating events  Reymundo denies having a sexually transmitted disease.   Pertinent medical history  Reymundo has a history of vaginal yeast infections. She has had zero (0) occurrences in the past year and the current symptoms are similar to previous yeast infections. She has used fluconazole (Diflucan) to treat previous yeast infections. 2 doses of fluconazole (Diflucan) has typically been needed for symptoms to resolve in the past.  She prefers a pill. She denies taking antibiotics in the past 2 weeks.   She does NOT smoke or use smokeless tobacco.   She denies pregnancy and denies breastfeeding. She has menstruated in the past month.      MEDICATIONS: Kids Multivitamin-Minerals oral, cholecalciferol (vitamin D3) oral, Omega-3 Fish Oil oral, ibuprofen oral, ALLERGIES: NKDA  Clinician Response:  Dear Reymundo,  Based on the information you have provided, you likely have a vaginal yeast infection which is a common infection of the vagina caused by a fungus. Yeast " are a type of fungus.  The most common symptom of a yeast infection is itching in and around the vagina. Other signs and symptoms include burning, redness, or a thick, white vaginal discharge that looks like cottage cheese and does not have a bad smell.  Medication information  I am prescribing:     Fluconazole (Diflucan) 150 mg oral tablet. Take 1 tablet by mouth in a single dose. Repeat dose in 3 days if symptoms are still present. There are no refills with this prescription.   Self care  Steps you can take to prevent symptoms of future vaginal yeast infection:     Avoid irritants such as scented bath products, tampons, pads, or vaginal sprays and powders    Avoid douching    Wear cotton underwear and if you are comfortable doing so, do not wear underwear to bed    Avoid hot tubs and whirlpool spas     When to seek care  Most women notice improvement in their symptoms within 1-2 days after starting treatment with complete clearing in 5-7 days.  Please make an appointment to be seen in a clinic or urgent care if any of the following occur:     Your symptoms have not improved in 3 days or not resolved in 10 days    You develop new symptoms or your symptoms become worse    If you think you may be at risk for an STD      Diagnosis: Candida vulvovaginitis  Diagnosis ICD: B37.3  Prescription: fluconazole (Diflucan) 150 mg oral tablet 2 tablet, 4 days supply. Take 1 tablet by mouth in a single dose, repeat dose in 3 days if symptoms are still present. Refills: 0, Refill as needed: no, Allow substitutions: yes  Pharmacy: [id number] - [phone] - [address] [place] [address]

## 2019-10-24 ENCOUNTER — OFFICE VISIT (OUTPATIENT)
Dept: FAMILY MEDICINE | Facility: CLINIC | Age: 44
End: 2019-10-24
Payer: COMMERCIAL

## 2019-10-24 VITALS
BODY MASS INDEX: 30.72 KG/M2 | TEMPERATURE: 98.8 F | HEART RATE: 64 BPM | WEIGHT: 173.4 LBS | RESPIRATION RATE: 12 BRPM | DIASTOLIC BLOOD PRESSURE: 78 MMHG | HEIGHT: 63 IN | SYSTOLIC BLOOD PRESSURE: 118 MMHG

## 2019-10-24 DIAGNOSIS — Z00.00 ROUTINE GENERAL MEDICAL EXAMINATION AT A HEALTH CARE FACILITY: Primary | ICD-10-CM

## 2019-10-24 DIAGNOSIS — E55.9 VITAMIN D DEFICIENCY: ICD-10-CM

## 2019-10-24 DIAGNOSIS — G56.01 CARPAL TUNNEL SYNDROME OF RIGHT WRIST: ICD-10-CM

## 2019-10-24 DIAGNOSIS — Z86.39 HISTORY OF HYPERTHYROIDISM: ICD-10-CM

## 2019-10-24 DIAGNOSIS — N92.1 MENORRHAGIA WITH IRREGULAR CYCLE: ICD-10-CM

## 2019-10-24 LAB
ANION GAP SERPL CALCULATED.3IONS-SCNC: 4 MMOL/L (ref 3–14)
BUN SERPL-MCNC: 18 MG/DL (ref 7–30)
CALCIUM SERPL-MCNC: 8.6 MG/DL (ref 8.5–10.1)
CHLORIDE SERPL-SCNC: 105 MMOL/L (ref 94–109)
CO2 SERPL-SCNC: 26 MMOL/L (ref 20–32)
CREAT SERPL-MCNC: 0.6 MG/DL (ref 0.52–1.04)
GFR SERPL CREATININE-BSD FRML MDRD: >90 ML/MIN/{1.73_M2}
GLUCOSE SERPL-MCNC: 83 MG/DL (ref 70–99)
POTASSIUM SERPL-SCNC: 3.9 MMOL/L (ref 3.4–5.3)
SODIUM SERPL-SCNC: 135 MMOL/L (ref 133–144)
TSH SERPL DL<=0.005 MIU/L-ACNC: 1.11 MU/L (ref 0.4–4)

## 2019-10-24 PROCEDURE — 82306 VITAMIN D 25 HYDROXY: CPT | Performed by: NURSE PRACTITIONER

## 2019-10-24 PROCEDURE — 36415 COLL VENOUS BLD VENIPUNCTURE: CPT | Performed by: NURSE PRACTITIONER

## 2019-10-24 PROCEDURE — 84443 ASSAY THYROID STIM HORMONE: CPT | Performed by: NURSE PRACTITIONER

## 2019-10-24 PROCEDURE — 80048 BASIC METABOLIC PNL TOTAL CA: CPT | Performed by: NURSE PRACTITIONER

## 2019-10-24 PROCEDURE — 99396 PREV VISIT EST AGE 40-64: CPT | Performed by: NURSE PRACTITIONER

## 2019-10-24 ASSESSMENT — PAIN SCALES - GENERAL: PAINLEVEL: NO PAIN (0)

## 2019-10-24 ASSESSMENT — MIFFLIN-ST. JEOR: SCORE: 1419.92

## 2019-10-24 NOTE — PATIENT INSTRUCTIONS
Preventive Health Recommendations  Female Ages 40 to 49    Yearly exam:     See your health care provider every year in order to  1. Review health changes.   2. Discuss preventive care.    3. Review your medicines if your doctor prescribed any.      Get a Pap test every three years (unless you have an abnormal result and your provider advises testing more often).      If you get Pap tests with HPV test, you only need to test every 5 years, unless you have an abnormal result. You do not need a Pap test if your uterus was removed (hysterectomy) and you have not had cancer.      You should be tested each year for STDs (sexually transmitted diseases), if you're at risk.     Ask your doctor if you should have a mammogram.      Have a colonoscopy (test for colon cancer) if someone in your family has had colon cancer or polyps before age 50.       Have a cholesterol test every 5 years.       Have a diabetes test (fasting glucose) after age 45. If you are at risk for diabetes, you should have this test every 3 years.    Shots: Get a flu shot each year. Get a tetanus shot every 10 years.     Nutrition:     Eat at least 5 servings of fruits and vegetables each day.    Eat whole-grain bread, whole-wheat pasta and brown rice instead of white grains and rice.    Get adequate Calcium and Vitamin D.      Lifestyle    Exercise at least 150 minutes a week (an average of 30 minutes a day, 5 days a week). This will help you control your weight and prevent disease.    Limit alcohol to one drink per day.    No smoking.     Wear sunscreen to prevent skin cancer.    See your dentist every six months for an exam and cleaning.     see GYN for the irregular periods      see Ortho for the right wrist.     Keep working out     Stay well hydrated - urine should be clear.

## 2019-10-24 NOTE — PROGRESS NOTES
SUBJECTIVE:   CC: Reymundo oJseph is an 43 year old woman who presents for preventive health visit.     Healthy Habits:    Getting at least 3 servings of Calcium per day:  Yes    Bi-annual eye exam:  Yes    Dental care twice a year:  Yes    Sleep apnea or symptoms of sleep apnea:  None    Diet:  Regular (no restrictions)    Duration of exercise:  Greater than 60 minutes    Taking medications regularly:  Yes    Barriers to taking medications:  None    Medication side effects:  None    PHQ-2 Total Score:    Additional concerns today:  Yes    *Is not fasting.  Gets  A lot of steps at work   Is biking  Weight lifting      Does have some carpel  Tunnel - right  Wrist. Wants to see hand specialist   Periods are very heavy and the  Last year periods are   Irregular   Will have to wear a tampon and pad  After an hour the tampon will leak,  Heavy for 3 days and will dwindle     Today's PHQ-2 Score:   PHQ-2 (  [employer]) 10/24/2019   Q1: Little interest or pleasure in doing things 0   Q2: Feeling down, depressed or hopeless 0   PHQ-2 Score 0   Q1: Little interest or pleasure in doing things -   Q2: Feeling down, depressed or hopeless -   PHQ-2 Score -     Abuse: Current or Past(Physical, Sexual or Emotional)- No  Do you feel safe in your environment? Yes    Social History     Tobacco Use     Smoking status: Former Smoker     Packs/day: 0.50     Types: Cigarettes     Last attempt to quit: 2006     Years since quittin.4     Smokeless tobacco: Former User   Substance Use Topics     Alcohol use: Yes     Alcohol/week: 2.5 - 3.3 standard drinks     If you drink alcohol do you typically have >3 drinks per day or >7 drinks per week? No    Alcohol Use 10/24/2019   Prescreen: >3 drinks/day or >7 drinks/week? No       Reviewed orders with patient.  Reviewed health maintenance and updated orders accordingly - Yes  Labs reviewed in EPIC    Mammogram Screening: Patient under age 50, mutual decision reflected in health  "maintenance.      Pertinent mammograms are reviewed under the imaging tab.  History of abnormal Pap smear: NO - age 30- 65 PAP every 3 years recommended  PAP / HPV Latest Ref Rng & Units 3/2/2017 2/17/2016 2/9/2015   PAP - NIL NIL NIL   HPV 16 DNA NEG Negative Negative -   HPV 18 DNA NEG Negative Negative -   OTHER HR HPV NEG Negative Negative -     Reviewed and updated as needed this visit by clinical staff  Tobacco  Allergies  Meds  Med Hx  Surg Hx  Fam Hx  Soc Hx        Reviewed and updated as needed this visit by Provider  Tobacco  Allergies  Meds  Med Hx  Surg Hx  Fam Hx  Soc Hx           Review of Systems  CONSTITUTIONAL: NEGATIVE for fever, chills, change in weight  INTEGUMENTARU/SKIN: NEGATIVE for worrisome rashes, moles or lesions  EYES: NEGATIVE for vision changes or irritation and POSITIVE for current with eye exam  ENT: NEGATIVE for ear, mouth and throat problems and Current with dentist   RESP: NEGATIVE for significant cough or SOB  BREAST: NEGATIVE for masses, tenderness or discharge  CV: NEGATIVE for chest pain, palpitations or peripheral edema  GI: NEGATIVE for nausea, abdominal pain, heartburn, or change in bowel habits   female: no unusual urinary symptoms and menses: menorrhagia periods   MUSCULOSKELETAL:POSITIVE  for joint pain right wrist pain .   NEURO: NEGATIVE for weakness, dizziness or paresthesias  ENDOCRINE: NEGATIVE for temperature intolerance, skin/hair changes  HEME/ALLERGY/IMMUNE: NEGATIVE for bleeding problems  PSYCHIATRIC: NEGATIVE for changes in mood or affect     OBJECTIVE:   /78 (BP Location: Left arm, Patient Position: Chair, Cuff Size: Adult Large)   Pulse 64   Temp 98.8  F (37.1  C) (Tympanic)   Resp 12   Ht 1.607 m (5' 3.27\")   Wt 78.7 kg (173 lb 6.4 oz)   LMP 09/24/2019 (Within Days)   Breastfeeding? No   BMI 30.46 kg/m    Physical Exam  GENERAL: healthy, alert and no distress  EYES: Eyes grossly normal to inspection, PERRL and conjunctivae and " sclerae normal  HENT: ear canals and TM's normal, nose and mouth without ulcers or lesions  NECK: no adenopathy, no asymmetry, masses, or scars and thyroid normal to palpation  RESP: lungs clear to auscultation - no rales, rhonchi or wheezes  BREAST: normal without masses, tenderness or nipple discharge and no palpable axillary masses or adenopathy  CV: regular rate and rhythm, normal S1 S2, no S3 or S4, no murmur, click or rub, no peripheral edema and peripheral pulses strong  ABDOMEN: soft, nontender, no hepatosplenomegaly, no masses and bowel sounds normal   (female): deferred  MS: does have right carpel , positive Tinel   SKIN: no suspicious lesions or rashes  NEURO: Normal strength and tone, mentation intact and speech normal  PSYCH: mentation appears normal, affect normal/bright  LYMPH: no cervical, supraclavicular, axillary, or inguinal adenopathy    Diagnostic Test Results:  Labs reviewed in Epic  Pending     ASSESSMENT/PLAN:     ASSESSMENT/PLAN:      ICD-10-CM    1. Routine general medical examination at a health care facility Z00.00 Basic metabolic panel  (Ca, Cl, CO2, Creat, Gluc, K, Na, BUN)   2. Carpal tunnel syndrome of right wrist G56.01 ORTHO  REFERRAL   3. Menorrhagia with irregular cycle N92.1 OB/GYN REFERRAL   4. Vitamin D deficiency E55.9 Vitamin D Deficiency   5. History of hyperthyroidism Z86.39 TSH with free T4 reflex       Patient Instructions     Preventive Health Recommendations  Female Ages 40 to 49    Yearly exam:     See your health care provider every year in order to  1. Review health changes.   2. Discuss preventive care.    3. Review your medicines if your doctor prescribed any.      Get a Pap test every three years (unless you have an abnormal result and your provider advises testing more often).      If you get Pap tests with HPV test, you only need to test every 5 years, unless you have an abnormal result. You do not need a Pap test if your uterus was removed  "(hysterectomy) and you have not had cancer.      You should be tested each year for STDs (sexually transmitted diseases), if you're at risk.     Ask your doctor if you should have a mammogram.      Have a colonoscopy (test for colon cancer) if someone in your family has had colon cancer or polyps before age 50.       Have a cholesterol test every 5 years.       Have a diabetes test (fasting glucose) after age 45. If you are at risk for diabetes, you should have this test every 3 years.    Shots: Get a flu shot each year. Get a tetanus shot every 10 years.     Nutrition:     Eat at least 5 servings of fruits and vegetables each day.    Eat whole-grain bread, whole-wheat pasta and brown rice instead of white grains and rice.    Get adequate Calcium and Vitamin D.      Lifestyle    Exercise at least 150 minutes a week (an average of 30 minutes a day, 5 days a week). This will help you control your weight and prevent disease.    Limit alcohol to one drink per day.    No smoking.     Wear sunscreen to prevent skin cancer.    See your dentist every six months for an exam and cleaning.     see GYN for the irregular periods      see Ortho for the right wrist.     Keep working out     Stay well hydrated - urine should be clear.                  COUNSELING:  Reviewed preventive health counseling, as reflected in patient instructions       Regular exercise       Healthy diet/nutrition       ortho     Estimated body mass index is 30.46 kg/m  as calculated from the following:    Height as of this encounter: 1.607 m (5' 3.27\").    Weight as of this encounter: 78.7 kg (173 lb 6.4 oz).    Weight management plan: Discussed healthy diet and exercise guidelines     reports that she quit smoking about 13 years ago. Her smoking use included cigarettes. She smoked 0.50 packs per day. She has quit using smokeless tobacco.      Counseling Resources:  ATP IV Guidelines  Pooled Cohorts Equation Calculator  Breast Cancer Risk Calculator  FRAX " Risk Assessment  ICSI Preventive Guidelines  Dietary Guidelines for Americans, 2010  USDA's MyPlate  ASA Prophylaxis  Lung CA Screening    HOSSEIN RODRIGUEZ NP, APRN CNP  [facility]

## 2019-10-24 NOTE — NURSING NOTE
"Initial /78 (BP Location: Left arm, Patient Position: Chair, Cuff Size: Adult Large)   Pulse 64   Temp 98.8  F (37.1  C) (Tympanic)   Resp 12   Ht 1.607 m (5' 3.27\")   Wt 78.7 kg (173 lb 6.4 oz)   LMP 09/24/2019 (Within Days)   Breastfeeding? No   BMI 30.46 kg/m   Estimated body mass index is 30.46 kg/m  as calculated from the following:    Height as of this encounter: 1.607 m (5' 3.27\").    Weight as of this encounter: 78.7 kg (173 lb 6.4 oz). .    Cherelle Bermudez CMA ([facility])    "

## 2019-10-25 LAB — DEPRECATED CALCIDIOL+CALCIFEROL SERPL-MC: 48 UG/L (ref 20–75)

## 2019-10-29 DIAGNOSIS — G56.01 CARPAL TUNNEL SYNDROME, RIGHT: Primary | ICD-10-CM

## 2019-11-14 ENCOUNTER — OFFICE VISIT (OUTPATIENT)
Dept: OBGYN | Facility: CLINIC | Age: 44
End: 2019-11-14
Payer: COMMERCIAL

## 2019-11-14 VITALS
BODY MASS INDEX: 30.21 KG/M2 | DIASTOLIC BLOOD PRESSURE: 73 MMHG | TEMPERATURE: 98.2 F | WEIGHT: 172 LBS | HEART RATE: 86 BPM | SYSTOLIC BLOOD PRESSURE: 127 MMHG

## 2019-11-14 DIAGNOSIS — N92.0 MENORRHAGIA WITH REGULAR CYCLE: Primary | ICD-10-CM

## 2019-11-14 DIAGNOSIS — Z30.430 ENCOUNTER FOR IUD INSERTION: ICD-10-CM

## 2019-11-14 LAB — HCG UR QL: NEGATIVE

## 2019-11-14 PROCEDURE — 58300 INSERT INTRAUTERINE DEVICE: CPT | Performed by: OBSTETRICS & GYNECOLOGY

## 2019-11-14 PROCEDURE — 81025 URINE PREGNANCY TEST: CPT | Performed by: OBSTETRICS & GYNECOLOGY

## 2019-11-14 PROCEDURE — 99243 OFF/OP CNSLTJ NEW/EST LOW 30: CPT | Mod: 25 | Performed by: OBSTETRICS & GYNECOLOGY

## 2019-11-14 NOTE — NURSING NOTE
"Chief Complaint   Patient presents with     Patient Request     MENORRAGIA AND IRREGULAR PERIODS     IUD     Placment Lindsay NDC:[id number] LOT:[id number] EXP:3/2022       Initial /73 (BP Location: Left arm, Patient Position: Sitting, Cuff Size: Adult Regular)   Pulse 86   Temp 98.2  F (36.8  C) (Oral)   Wt 78 kg (172 lb)   BMI 30.21 kg/m   Estimated body mass index is 30.21 kg/m  as calculated from the following:    Height as of 10/24/19: 1.607 m (5' 3.27\").    Weight as of this encounter: 78 kg (172 lb).  BP completed using cuff size: regular    Questioned patient about current smoking habits.  Pt. quit smoking some time ago.          The following HM Due: NONE      The following patient reported/Care Every where data was sent to:  P ABSTRACT QUALITY INITIATIVES [[id number]]  alexander Moreno MA           "

## 2019-11-14 NOTE — PROGRESS NOTES
GYN Clinic Note  Date: 2019    CC:  Abnormal Uterine Bleeding    HPI:  Reymundo Joseph is a 43 year old female  presents for evaluation of abnormal uterine bleeding in consultation from Sofie Burgos.     Cycles have always been heavy in general since menarche at age 11.  They are typically pretty predictable, about every 28 days.  Has had a cycle as long as 38 days, but that is not usual for her.  Her cycles improved somewhat after having kids, but have been getting increasingly worse.  She wants to do something about the heavy bleeding.  Has tried several different birth control pills over the years and didn't like side effects.  Had prolonged spotting, mood symptoms, etc.    Her aunt has had an ablation and she is interested in talking about that.  Also open to other options that may help as well.    Her work-up thus far for her abnormal bleeding has included:  - TSH: 1.11 on 10/24/19  - Last pap: 3/2/17, NIL and HPV neg    GYN HISTORY:  No LMP recorded.    Menarche: 11  Menses: occured every 28 days lasting 7 days in duration.   History of abnormal pap: yes, ASC-H.  Most recent pap was normal.  Due in    History of cervical procedures: denies   Contraceptive History: pills  The patient is sexually active with male partners.   has had vasectomy    Patient has following risk factors for endometrial cancer:  - Unopposed estrogen exposure: No  - Obesity: Yes. Details:   Body mass index is 30.21 kg/m .   - Smoking: No  - Nulliparity: No  - Infertility: No  - Early age of menarche / late age of menopause: Menarche at age 11  - Family history of colon cancer, ovarian cancer, and type I endometrial cancer: No    OBSTETRIC HISTORY:   OB History    Para Term  AB Living   2 1 1 0 0 2   SAB TAB Ectopic Multiple Live Births   0 0 0 0 2      # Outcome Date GA Lbr Jg/2nd Weight Sex Delivery Anes PTL Lv   2 Term 11 39w1d  3.204 kg (7 lb 1 oz) F CS-LTranv Spinal N  LOBITO      Birth Comments: Passed hearing screen.  Mom had post c/s complications and needed to stay additional time.      Name: SAMUEL VEGA      Apgar1: 8  Apgar5: 9   1  08 38w4d  3.289 kg (7 lb 4 oz) F CS   LOBITO         Past Medical History:   Diagnosis Date     Abnormal Pap smear, can't excl hi gd sq intraepithelial lesion (ASC-H) 2011    colp - focal atypia     ASCUS favor benign 2014    neg HPV per ASCCP rpt cotesting in 3 yrs. Due 2017     Hyperthyroidism 2009     Lumbar back pain 2010    from injury      Neck pain 2010    from injury      NO ACTIVE PROBLEMS          Past Surgical History:   Procedure Laterality Date     C NONSPECIFIC PROCEDURE      Tonsillectomy      SECTION  2011    Procedure: SECTION; Surgeon:CHIKIS CLINE; Location: L+D     Social History     Tobacco Use     Smoking status: Former Smoker     Packs/day: 0.50     Types: Cigarettes     Last attempt to quit: 2006     Years since quittin.5     Smokeless tobacco: Former User   Substance Use Topics     Alcohol use: Yes     Alcohol/week: 2.5 - 3.3 standard drinks     Drug use: No         Family History   Problem Relation Age of Onset     Neurologic Disorder Mother         migraines, headaches     Thyroid Disease Mother      Hypertension Mother      Lipids Mother         +     Thyroid Disease Maternal Grandmother      Cancer Maternal Grandmother      Psychotic Disorder Maternal Grandmother      Depression Maternal Grandmother      Heart Disease Maternal Grandfather      Hypertension Maternal Grandfather      Thyroid Disease Sister      Osteoporosis Sister          Current Outpatient Medications   Medication Sig Dispense Refill     cyclobenzaprine (FLEXERIL) 10 MG tablet Take 1 tablet (10 mg) by mouth nightly as needed for muscle spasms Take ONLY if needed 30 tablet 1     ibuprofen (ADVIL,MOTRIN) 200 MG tablet Take 200 mg by mouth Reported on 3/2/2017        Multiple Vitamins-Minerals (MULTIVITAMIN ADULT PO)        Omega-3 Fatty Acids (FISH OIL PO) Take 2,000 mg by mouth       VITAMIN D, CHOLECALCIFEROL, PO Take 2,000 Units by mouth daily         Allergies: No known drug allergies    ROS:  C: NEGATIVE for fever, chills, change in weight  R: NEGATIVE for significant cough or SOB  CV: NEGATIVE for chest pain, palpitations or peripheral edema  GI: NEGATIVE for nausea, abdominal pain, heartburn, or change in bowel habits  : +abnormal bleeding as above. NEGATIVE for frequency, dysuria, hematuria, vaginal discharge  M: NEGATIVE for significant arthralgias or myalgia  N: NEGATIVE for weakness, dizziness or paresthesias    EXAM:  Blood pressure 127/73, pulse 86, temperature 98.2  F (36.8  C), temperature source Oral, weight 78 kg (172 lb), not currently breastfeeding.   BMI= Body mass index is 30.21 kg/m .  General - pleasant female in no acute distress.  Abdomen - soft, nontender, nondistended, no hepatosplenomegaly.  Pelvic - external genitalia: normal adult female without lesions or abnormalities; BUS: within normal limits; Vagina: well rugated, no discharge, no lesions; Cervix: no lesions or CMT; Uterus: anteverted, 8 week sized, mobile and nontender; Adnexae: no masses or tenderness.  Rectovaginal - deferred.  Musculoskeletal - no gross deformities.  Neurological - normal strength, sensation, and mental status.      ASSESSMENT:  Reymundo Joseph is a 43 year old  who presents with abnormal uterine bleeding.    PLAN:  Discussed pros/cons of endometrial ablation.  Can work to eliminate periods, but doesn't work for everything and doesn't always last until menopause.  Additionally, would require endometrial biopsy to rule out malignancy first and would make it harder to evaluate/biopsy endometrium in the future if there was a concern for malignancy.  Discussed Mirena IUD as an alternative.  Discussed rates of amenorrhea as well as side effect profile.  Her   had a vasectomy, so she's not concerned about the contraceptive benefit.  Discussed that if Mirena doesn't work for her, we can always revisit the idea of an ablation. However, typically can't place a Mirena after an ablation.      At the end of our discussion, Reymundo elected to proceed with Mirena placement, which was performed without complication.  See procedure note for details.    Caron Gonzalez MD

## 2019-11-21 NOTE — PROGRESS NOTES
PROCEDURE: IUD insertion  Patient has verbalized understanding of risks and benefits. She was counseled on risks of infection, bleeding, uterine perforation, cervical laceration, expulsion, overall risk of pregnancy 2 in 1000, if she does get pregnant that there is an increased risk of ectopic pregnancy. All questions answered. She has signed the consent form.    Urine pregnancy test was negative and patient's  has had a vasectomy    A regular Ara speculum was placed in the vagina with good visualization of the cervix.  The cervix was then swabbed with a betadine x3.  Tenaculum was placed at the 12 o'clock position on the cervix and the uterus sounded to 7cm.  The Mirena  IUD was then placed in the usual fashion under sterile technique without difficulty.  Strings were clipped about 2-3 cm from the cervical os.  Tenaculum was removed and cervix was hemostatic. There were no complications. The patient tolerated the procedure well.    Bleeding pattern of this particular IUD was discussed with the patient. She is aware that the IUD will need to be removed in 5 years or PRN.  She is to return to clinic for her next annual or PRN.    Caron Gonzalez MD

## 2019-12-02 ENCOUNTER — OFFICE VISIT (OUTPATIENT)
Dept: NEUROLOGY | Facility: CLINIC | Age: 44
End: 2019-12-02
Attending: ORTHOPAEDIC SURGERY
Payer: COMMERCIAL

## 2019-12-02 DIAGNOSIS — G56.01 CARPAL TUNNEL SYNDROME, RIGHT: ICD-10-CM

## 2019-12-02 NOTE — PROGRESS NOTES
[facility]  Electrodiagnostic Laboratory    Nerve Conduction & EMG Report          Patient: Reymundo Joseph YOB: 1975  Patient ID: [id number] Age: 43 Years 11 Months  Gender: Female      History & Examination:  43 year old woman with burning pain and paraesthesia in the right hand. Onset about 1 year ago. Eval for focal neuropathy.     Techniques:  Sensory and motor conduction studies were done with surface recording electrodes. EMG was done with a concentric needle electrode.     Results:  Nerve conduction studies:  1. Right median-D2, ulnar-D5, and radial sensory responses are normal.   2. Right median-ulnar palmar interlatency difference is prolonged.   3. Right median-APB and ulnar-ADM motor responses are normal.   4. Right median-lumbrical compared to ulnar-interossei latency is prolonged.     Needle EMG of selected proximal and distal right upper limb muscles was performed as tabulated below. No abnormal spontaneous activity was observed in the sampled muscles. Motor unit potential morphology and recruitment patterns were normal.     Interpretation:  This is an abnormal study. There is electrophysiologic evidence of a very mild right-sided median neuropathy across the wrist (e.g., carpal tunnel syndrome). Clinical correlation is recommended.     Codey Olvera MD  Department of Neurology        Sensory NCS      Nerve / Sites Rec. Site Onset Peak Ref. NP Amp Ref. PP Amp Dist Dillon Ref. Temp     ms ms ms  V  V  V cm m/s m/s  C   R MEDIAN - Dig II Anti      Wrist Dig II 2.60 3.65  20.6 10.0 33.7 14 53.8 48.0 32.5   R ULNAR - Dig V Anti      Wrist Dig V 2.14 2.86  27.2 8.0 39.5 12.5 58.5 48.0 32.4   R RADIAL - Snuff      Forearm Snuff 1.61 2.08  21.7 15.0 43.4 10 61.9 48.0 32.5   R MEDIAN - Ulnar - Palmar      Median Wrist 1.88 2.40 2.40 53.6  75.7 8 42.7  32.4      Ulnar Wrist 1.15 1.67 2.40 23.0  40.8 8 69.8  32.5       Motor NCS      Nerve / Sites Rec. Site Lat Ref. Amp Ref.  Rel Amp Dist Dillon Ref. Dur. Area Temp.     ms ms mV mV % cm m/s m/s ms %  C   R MEDIAN - APB      Wrist APB 3.54 4.40 8.4 5.0 100 8   6.98 100 32.4      Elbow APB 7.45  9.3  110 21 53.8 48.0 6.98 105 32.5   R ULNAR - ADM      Wrist ADM 2.45 3.50 13.9 5.0 100 8   8.07 100 32.4      B.Elbow ADM 5.31  12.4  89.3 18 62.8 48.0 8.13 96.6 32.4      A.Elbow ADM 6.93  12.5  90.1 10 61.9 48.0 7.86 96.3 32.5   R MEDIAN - II Lumb      Median II Lumb 3.75  2.8  100 10   5.47 100       Ulnar Palm Int 2.81  6.1  216 10   5.21 169        EMG Summary Table     Spontaneous MUAP Recruitment    IA Fib/PSW Fasc H.F. Amp Dur. PPP Pattern   R. FIRST D INTEROSS N None None None N N None Normal   R. EXT INDICIS N None None None N N None Normal   R. PRON TERES N None None None N N None Normal   R. BICEPS N None None None N N None Normal   R. ABD POLL BREVIS N None None None N N None Normal

## 2019-12-02 NOTE — LETTER
12/2/2019       RE: Reymundo Joseph  [address]  [facility] [address]     Dear Colleague,    Thank you for referring your patient, Reymundo Joseph, to the [facility] EMG at [facility]. Please see a copy of my visit note below.        [facility]  Electrodiagnostic Laboratory    Nerve Conduction & EMG Report      Patient: Reymundo Joseph YOB: 1975  Patient ID: [id number] Age: 43 Years 11 Months  Gender: Female      History & Examination:  43 year old woman with burning pain and paraesthesia in the right hand. Onset about 1 year ago. Eval for focal neuropathy.     Techniques:  Sensory and motor conduction studies were done with surface recording electrodes. EMG was done with a concentric needle electrode.     Results:  Nerve conduction studies:  1. Right median-D2, ulnar-D5, and radial sensory responses are normal.   2. Right median-ulnar palmar interlatency difference is prolonged.   3. Right median-APB and ulnar-ADM motor responses are normal.   4. Right median-lumbrical compared to ulnar-interossei latency is prolonged.     Needle EMG of selected proximal and distal right upper limb muscles was performed as tabulated below. No abnormal spontaneous activity was observed in the sampled muscles. Motor unit potential morphology and recruitment patterns were normal.     Interpretation:  This is an abnormal study. There is electrophysiologic evidence of a very mild right-sided median neuropathy across the wrist (e.g., carpal tunnel syndrome). Clinical correlation is recommended.     Codey Olvera MD  Department of Neurology      Sensory NCS      Nerve / Sites Rec. Site Onset Peak Ref. NP Amp Ref. PP Amp Dist Dillon Ref. Temp     ms ms ms  V  V  V cm m/s m/s  C   R MEDIAN - Dig II Anti      Wrist Dig II 2.60 3.65  20.6 10.0 33.7 14 53.8 48.0 32.5   R ULNAR - Dig V Anti      Wrist Dig V 2.14 2.86  27.2 8.0 39.5 12.5 58.5 48.0 32.4    R RADIAL - Snuff      Forearm Snuff 1.61 2.08  21.7 15.0 43.4 10 61.9 48.0 32.5   R MEDIAN - Ulnar - Palmar      Median Wrist 1.88 2.40 2.40 53.6  75.7 8 42.7  32.4      Ulnar Wrist 1.15 1.67 2.40 23.0  40.8 8 69.8  32.5       Motor NCS      Nerve / Sites Rec. Site Lat Ref. Amp Ref. Rel Amp Dist Dillon Ref. Dur. Area Temp.     ms ms mV mV % cm m/s m/s ms %  C   R MEDIAN - APB      Wrist APB 3.54 4.40 8.4 5.0 100 8   6.98 100 32.4      Elbow APB 7.45  9.3  110 21 53.8 48.0 6.98 105 32.5   R ULNAR - ADM      Wrist ADM 2.45 3.50 13.9 5.0 100 8   8.07 100 32.4      B.Elbow ADM 5.31  12.4  89.3 18 62.8 48.0 8.13 96.6 32.4      A.Elbow ADM 6.93  12.5  90.1 10 61.9 48.0 7.86 96.3 32.5   R MEDIAN - II Lumb      Median II Lumb 3.75  2.8  100 10   5.47 100       Ulnar Palm Int 2.81  6.1  216 10   5.21 169        EMG Summary Table     Spontaneous MUAP Recruitment    IA Fib/PSW Fasc H.F. Amp Dur. PPP Pattern   R. FIRST D INTEROSS N None None None N N None Normal   R. EXT INDICIS N None None None N N None Normal   R. PRON TERES N None None None N N None Normal   R. BICEPS N None None None N N None Normal   R. ABD POLL BREVIS N None None None N N None Normal                      Again, thank you for allowing me to participate in the care of your patient.      Sincerely,    Codey Olvera MD

## 2019-12-03 DIAGNOSIS — G56.01 RIGHT CARPAL TUNNEL SYNDROME: Primary | ICD-10-CM

## 2019-12-04 ENCOUNTER — TELEPHONE (OUTPATIENT)
Dept: ORTHOPEDICS | Facility: CLINIC | Age: 44
End: 2019-12-04

## 2019-12-04 NOTE — TELEPHONE ENCOUNTER
RECORDS RECEIVED FROM: Right carpal tunnel, EMG 12/2 - scheduled per pt   DATE RECEIVED: Dec 9, 2019     NOTES STATUS DETAILS   OFFICE NOTE from referring provider Internal  Sofie Burgos APRN CNP      OFFICE NOTE from other specialist Internal  Codey Olvera MD    DISCHARGE SUMMARY from hospital N/A    DISCHARGE REPORT from the ER N/A    OPERATIVE REPORT N/A    MEDICATION LIST Internal    IMPLANT RECORD/STICKER N/A    LABS     CBC/DIFF N/A    CULTURES N/A    INJECTIONS DONE IN RADIOLOGY N/A    MRI N/A    CT SCAN N/A    XRAYS (IMAGES & REPORTS) N/A    TUMOR     PATHOLOGY  Slides & report N/A      12/04/19   10:26 AM   Pre-visit complete  Mackenzie Glamm, CMA

## 2019-12-04 NOTE — TELEPHONE ENCOUNTER
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     Action Taken: Message routed to:  [facility] ([facility] ORTHO

## 2019-12-09 ENCOUNTER — OFFICE VISIT (OUTPATIENT)
Dept: ORTHOPEDICS | Facility: CLINIC | Age: 44
End: 2019-12-09
Payer: COMMERCIAL

## 2019-12-09 ENCOUNTER — PRE VISIT (OUTPATIENT)
Dept: ORTHOPEDICS | Facility: CLINIC | Age: 44
End: 2019-12-09

## 2019-12-09 DIAGNOSIS — G56.01 RIGHT CARPAL TUNNEL SYNDROME: Primary | ICD-10-CM

## 2019-12-09 NOTE — NURSING NOTE
Teaching Flowsheet   Relevant Diagnosis: Right carpal tunnel syndrome  Teaching Topic: Right open carpal tunnel release     Person(s) involved in teaching:   Patient     Motivation Level:  Asks Questions: Yes  Eager to Learn: Yes  Cooperative: Yes  Receptive (willing/able to accept information): Yes  Any cultural factors/Mu-ism beliefs that may influence understanding or compliance? No       Patient demonstrates understanding of the following:  Reason for the appointment, diagnosis and treatment plan: Yes  Knowledge of proper use of medications and conditions for which they are ordered (with special attention to potential side effects or drug interactions): Yes  Which situations necessitate calling provider and whom to contact: Yes       Teaching Concerns Addressed:        Proper use and care of  (medical equip, care aids, etc.): Yes  Nutritional needs and diet plan: Yes  Pain management techniques: Yes  Wound Care: Yes  How and/when to access community resources: Yes     Instructional Materials Used/Given: Preoperative teaching packet, antibacterial soap  Tory Ortiz RN

## 2019-12-09 NOTE — LETTER
2019       RE: Reymundo Joseph  [address] [facility] [address]     Dear Colleague,    Thank you for referring your patient, Reymundo Joseph, to the [facility] ORTHOPAEDIC CLINIC at [facility]. Please see a copy of my visit note below.    [facility]  Orthopedics  Jeffrey Preciado MD  2019     Name: Reymundo Joseph  MRN: [id number]  Age: 43 year old  : 1975  Referring provider: Jeffrey Preciado     Chief Complaint: Consult (R Carpal Tunnel, EMG  )    History of Present Illness:   Reymundo Joseph is a 43 year old female who presents today for evaluation. The patient states that 1.5 years ago she woke up at night with a burning pain, numbness and tingling in her right hand. Since this time she has continued to have these episodes that wake her up, at night time only, occurring on a regular basis. She has self-diagnosed carpal tunnel syndrome and this has been confirmed by EMG. Symptoms continue to happen at night-time only but wake her up from sleep and are quite disruptive. The numbness and  paresthesias include her thumb, index, and middle finger. She has not tried night time splints.     Review of Systems:   A 10-point review of systems was obtained and is negative except for as noted in the HPI.     Medications:     Current Outpatient Medications:      cyclobenzaprine (FLEXERIL) 10 MG tablet, Take 1 tablet (10 mg) by mouth nightly as needed for muscle spasms Take ONLY if needed, Disp: 30 tablet, Rfl: 1     ibuprofen (ADVIL,MOTRIN) 200 MG tablet, Take 200 mg by mouth Reported on 3/2/2017, Disp: , Rfl:      Multiple Vitamins-Minerals (MULTIVITAMIN ADULT PO), , Disp: , Rfl:      Omega-3 Fatty Acids (FISH OIL PO), Take 2,000 mg by mouth, Disp: , Rfl:      VITAMIN D, CHOLECALCIFEROL, PO, Take 2,000 Units by mouth daily, Disp: , Rfl:     Current Facility-Administered Medications:      levonorgestrel (MIRENA) 20 MCG/24HR  IUD 20 mcg, 1 each, Intrauterine, Once, Caron Gonzalez MD    Allergies:  No known drug allergies     Past Medical History:  Past Medical History:   Diagnosis Date     Abnormal Pap smear, can't excl hi gd sq intraepithelial lesion (ASC-H) 2011    colp - focal atypia     ASCUS favor benign 2014    neg HPV per [facility]P rpt cotesting in 3 yrs. Due 2017     Hyperthyroidism 2009     Lumbar back pain 2010    from injury      Neck pain 2010    from injury      NO ACTIVE PROBLEMS      Past Surgical History:  Past Surgical History:   Procedure Laterality Date     C NONSPECIFIC PROCEDURE  1984    Tonsillectomy      SECTION  2011    Procedure: SECTION; Surgeon:CHIKIS CLINE; Location: L+D        Social History:  Works as a nurse.   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   Denies drug use.    Family History:  No pertinent family history    Physical Examination:  Well developed, well nourished, in no acute distress. Follow instructions appropriately. Alert and oriented to surroundings.   Right upper extremity:   Skin clean, dry and intact  Sensation:  Thumb opposition strength: intact  Thenar atrophy: Not Present  Tinel's over carpal tunnel: Present  Tinel's over cubital tunnel: Present  Phalen's: Positive  Carpal tunnel compression: Positive  2-point discrimination  2 mm to median and radial .   SILT m/r/u    Electrodiagnostic Studies:   This is an abnormal study. There is electrophysiologic evidence of a very mild right-sided median neuropathy across the wrist (e.g., carpal tunnel syndrome). Clinical correlation is recommended.     Assessment:   43 year old female with mild right carpal tunnel syndrome.    -Plan:     Reymundo Joseph and I had a lengthy discussion about EMG results and possible treatment options. We discussed three possible treatment options. The first would be to attempt night splinting and to observe the symptoms for any change or  progression. The second would be to perform a corticosteroid injection. The third is to consider surgery, which would be an open carpal tunnel release. She is not interesting in splinting at this time and wishes to proceed with surgery. I discussed that it can be effective for mild carpal tunnel syndrome and is the preferred initial treatment before proceeding surgically. However, given the duration of her symptoms, she feels this is unlikely to be effective, and I agree the likelihood seems lower unless she is willing to continue this practice in the long-term.  I have described the procedure, post-operative protocol, and expected outcomes.  I also discussed the risks of surgery including but  not limited to, bleeding, infection, nerve or vessel damage, wound healing problems, persistent pain, persistent numbness and the possibility for further surgery. After a full discussion of risks, benefits, and alternatives to surgery, the patient expressed understanding and elected to proceed. Informed consent was obtained for a  right carpal tunnel release. This will be done under local only anesthesia. We have discussed this previously as she does work in the preop area and surgery is already scheduled.    Jeffrey Preciado MD    Scribe Disclosure:  I, Kalen العراقي, am serving as a scribe to document services personally performed by Jeffrey Preciado MD at this visit, based upon the provider's statements to me. All documentation has been reviewed by the aforementioned provider prior to being entered into the official medical record.

## 2019-12-09 NOTE — PROGRESS NOTES
[facility]  Orthopedics  Jeffrey Preciado MD  2019     Name: Reymundo Joseph  MRN: [id number]  Age: 43 year old  : 1975  Referring provider: Jeffrey Preciado     Chief Complaint: Consult (R Carpal Tunnel, EMG  )    History of Present Illness:   Reymundo Joseph is a 43 year old female who presents today for evaluation. The patient states that 1.5 years ago she woke up at night with a burning pain, numbness and tingling in her right hand. Since this time she has continued to have these episodes that wake her up, at night time only, occurring on a regular basis. She has self-diagnosed carpal tunnel syndrome and this has been confirmed by EMG. Symptoms continue to happen at night-time only but wake her up from sleep and are quite disruptive. The numbness and  paresthesias include her thumb, index, and middle finger. She has not tried night time splints.     Review of Systems:   A 10-point review of systems was obtained and is negative except for as noted in the HPI.     Medications:     Current Outpatient Medications:      cyclobenzaprine (FLEXERIL) 10 MG tablet, Take 1 tablet (10 mg) by mouth nightly as needed for muscle spasms Take ONLY if needed, Disp: 30 tablet, Rfl: 1     ibuprofen (ADVIL,MOTRIN) 200 MG tablet, Take 200 mg by mouth Reported on 3/2/2017, Disp: , Rfl:      Multiple Vitamins-Minerals (MULTIVITAMIN ADULT PO), , Disp: , Rfl:      Omega-3 Fatty Acids (FISH OIL PO), Take 2,000 mg by mouth, Disp: , Rfl:      VITAMIN D, CHOLECALCIFEROL, PO, Take 2,000 Units by mouth daily, Disp: , Rfl:     Current Facility-Administered Medications:      levonorgestrel (MIRENA) 20 MCG/24HR IUD 20 mcg, 1 each, Intrauterine, Once, Caron Gonzalez MD    Allergies:  No known drug allergies     Past Medical History:  Past Medical History:   Diagnosis Date     Abnormal Pap smear, can't excl hi gd sq intraepithelial lesion (ASC-H) 2011    colp - focal atypia     ASCUS favor benign  2014    neg HPV per [facility]P rpt cotesting in 3 yrs. Due 2017     Hyperthyroidism 2009     Lumbar back pain 2010    from injury      Neck pain 2010    from injury      NO ACTIVE PROBLEMS      Past Surgical History:  Past Surgical History:   Procedure Laterality Date     C NONSPECIFIC PROCEDURE  1984    Tonsillectomy      SECTION  2011    Procedure: SECTION; Surgeon:CHIKIS CLINE; Location: L+D        Social History:  Works as a nurse.   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   Denies drug use.    Family History:  No pertinent family history    Physical Examination:  Well developed, well nourished, in no acute distress. Follow instructions appropriately. Alert and oriented to surroundings.   Right upper extremity:   Skin clean, dry and intact  Sensation:  Thumb opposition strength: intact  Thenar atrophy: Not Present  Tinel's over carpal tunnel: Present  Tinel's over cubital tunnel: Present  Phalen's: Positive  Carpal tunnel compression: Positive  2-point discrimination  2 mm to median and radial .   SILT m/r/u    Electrodiagnostic Studies:   This is an abnormal study. There is electrophysiologic evidence of a very mild right-sided median neuropathy across the wrist (e.g., carpal tunnel syndrome). Clinical correlation is recommended.     Assessment:   43 year old female with mild right carpal tunnel syndrome.    -Plan:     Reymundo Joseph and I had a lengthy discussion about EMG results and possible treatment options. We discussed three possible treatment options. The first would be to attempt night splinting and to observe the symptoms for any change or progression. The second would be to perform a corticosteroid injection. The third is to consider surgery, which would be an open carpal tunnel release. She is not interesting in splinting at this time and wishes to proceed with surgery. I discussed that it can be effective for mild carpal tunnel  syndrome and is the preferred initial treatment before proceeding surgically. However, given the duration of her symptoms, she feels this is unlikely to be effective, and I agree the likelihood seems lower unless she is willing to continue this practice in the long-term.  I have described the procedure, post-operative protocol, and expected outcomes.  I also discussed the risks of surgery including but  not limited to, bleeding, infection, nerve or vessel damage, wound healing problems, persistent pain, persistent numbness and the possibility for further surgery. After a full discussion of risks, benefits, and alternatives to surgery, the patient expressed understanding and elected to proceed. Informed consent was obtained for a  right carpal tunnel release. This will be done under local only anesthesia. We have discussed this previously as she does work in the preop area and surgery is already scheduled.    Jeffrey Preciado MD    Scribe Disclosure:  I, Kalen العراقي, am serving as a scribe to document services personally performed by Jeffrey Preciado MD at this visit, based upon the provider's statements to me. All documentation has been reviewed by the aforementioned provider prior to being entered into the official medical record.

## 2019-12-09 NOTE — NURSING NOTE
Reason For Visit:   Chief Complaint   Patient presents with     Consult     R Carpal Tunnel, EMG 12/2      Primary MD: Sofie Burgos    ?  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  Full time.    Date of injury: 1.5 year ago. Woke up to bright burning pain in Wrist    Date of surgery: NA  Type of surgery: NA    Smoker: No  Request smoking cessation information: No    There were no vitals taken for this visit.      Pain Assessment  Patient Currently in Pain: Denies        QuickDASH Assessment  No flowsheet data found.       Allergies   Allergen Reactions     No Known Drug Allergies        Cathleen Blankenship ATC

## 2019-12-18 NOTE — DISCHARGE INSTRUCTIONS
"Instructions for after your surgery    Keep the surgical dressing on and dry for 3 days. After 3 days, you may remove the surgical dressing and begin getting the wound wet in the shower and washing it gently with soap and water on a daily basis. Please keep wound covered until your follow-up visit. After the surgical dressing comes off, you may use a bandaid or a \"Coban\"-type dressing with gauze for this.  Avoid prolonged immersion of incision in water (such as tub baths, swimming, hot tubs, and dish washing without gloves) until skin is healed (about 3 weeks)     Keep your operative arm elevated as much as possible. This will help with pain and swelling.     Do not lift anything heavier than a coffee cup with your operative hand. You can use it for light activities like eating and brushing your teeth.    You will have a follow-up appointment scheduled with a nurse or  for stitch removal prior to your 6 week post-op appointment with Dr. Preciado. If you do not know when this appointment is, please call Dr. Preciado's office to find out.     Call Dr. Preciado's office if you experience any of the following:   Fevers, chills, increasing wound drainage, pain that is not controlled with the medications you have been prescribing, swelling that is not controlled with elevation, problems with your splint, or any other questions or concerns.           "

## 2019-12-19 ENCOUNTER — HOSPITAL ENCOUNTER (OUTPATIENT)
Facility: AMBULATORY SURGERY CENTER | Age: 44
End: 2019-12-19
Attending: ORTHOPAEDIC SURGERY
Payer: COMMERCIAL

## 2019-12-19 VITALS
RESPIRATION RATE: 14 BRPM | TEMPERATURE: 98.2 F | OXYGEN SATURATION: 100 % | BODY MASS INDEX: 26.46 KG/M2 | WEIGHT: 155 LBS | HEIGHT: 64 IN | HEART RATE: 76 BPM | SYSTOLIC BLOOD PRESSURE: 124 MMHG | DIASTOLIC BLOOD PRESSURE: 96 MMHG

## 2019-12-19 DIAGNOSIS — G56.01 RIGHT CARPAL TUNNEL SYNDROME: ICD-10-CM

## 2019-12-19 RX ORDER — LIDOCAINE HYDROCHLORIDE AND EPINEPHRINE 10; 10 MG/ML; UG/ML
10 INJECTION, SOLUTION INFILTRATION; PERINEURAL ONCE
Status: COMPLETED | OUTPATIENT
Start: 2019-12-19 | End: 2019-12-19

## 2019-12-19 RX ORDER — HYDROCODONE BITARTRATE AND ACETAMINOPHEN 5; 325 MG/1; MG/1
1 TABLET ORAL EVERY 6 HOURS PRN
Qty: 10 TABLET | Refills: 0 | Status: SHIPPED | OUTPATIENT
Start: 2019-12-19 | End: 2020-06-11

## 2019-12-19 ASSESSMENT — MIFFLIN-ST. JEOR: SCORE: 1343.08

## 2019-12-19 NOTE — OP NOTE
PREOPERATIVE DIAGNOSIS:  Right carpal tunnel syndrome.      POSTOPERATIVE DIAGNOSIS:  Right carpal tunnel syndrome.      PROCEDURE:  Right open carpal tunnel release.      ATTENDING SURGEON:  Jeffrey Preciado MD      ASSISTANT:  None.      ANESTHESIA:  Local anesthesia only consisting of 10 mL of 1% lidocaine with epinephrine 1:100,000      TOURNIQUET TIME:  6 min     ESTIMATED BLOOD LOSS:  1 mL.      SPECIMENS:  None.      DRAINS:  None.      IMPLANTS:  None.      COMPLICATIONS:  None apparent.      BRIEF HISTORY:  Reymundo Joseph is a 44 year old-year-old female who presented with a history of numbness and tingling in a median nerve distribution.  She has undergone a trial of splinting but has had persistence of symptoms.  I had a discussion with the patient regarding open carpal tunnel release.  I described the procedure, post-operative protocol and expected outcomes.  We discussed the risks, benefits and alternatives to surgery.  Risks discussed include bleeding, infection, nerve or vessel damage, wound healing problems, persistent pain, persistent numbness, and the possibility for further surgery.  After a full discussion of risks, benefits, and alternatives to surgery, the patient elected to proceed and informed consent was obtained.    INTRAOPERATIVE FINDINGS:  The transverse carpal ligament was thickened.  The median nerve was hyperemic.      DESCRIPTION OF PROCEDURE:  The patient was identified in the preoperative holding area.  The informed consent was reviewed. The operative site was identified and marked. A total of 10 mL of 1% lidocaine with epinephrine in a 1:100,000 ratio was injected into the surgical area. The patient was brought to the operating room and positioned with the operative extremity over a hand table. A forearm tourniquet was placed. The operative arm was then prepped and draped in a standard sterile fashion.  A timeout was performed, verifying the correct patient, procedure to be  performed, and operative site.  The arm was exsanguinated and the tourniquet inflated. A longitudinal incision was made in line  with the radial aspect of the ring finger from the distal wrist flexion crease to Grant's line.  This was taken down sharply to the level of the palmar fascia.  Hemostasis was maintained with a bipolar throughout the procedure. The palmar fascia was incised in line with the skin incision.  The transverse fibers of the transverse carpal ligament were identified and released from their ulnar attachment in both a proximal and distal direction.  Distally, the release was completed until the palmar fat was present.  Proximally, the tenotomy scissors were used to bluntly dissect above and below the transverse carpal ligament and it was released under direct visualization into the distal forearm.  Palpation and visualization confirmed complete release of the ligament.  The carpal tunnel was then inspected with findings as listed above. The tourniquet was taken down and hemostasis achieved. The wound was thoroughly irrigated with normal saline.  The skin was closed with interrupted horizontal mattress 4-0 nylon sutures.  A soft sterile dressing was applied of adaptic, 4x4 gauze, and simba and then overwrapped with coban applied in a non-constrictive fashion. The patient tolerated the procedure well and there were no immediate complications.  She was brought to the recovery room in stable condition.  All sponge and needle counts were correct at the end of the case.      POSTOPERATIVE PLAN:  The patient will be discharged to home today with oral pain medications.  The patient will elevate and ice.  The dressing should remain clean, dry, and intact for the next five days, at which point it can be removed and replaced with a bandaid. Finger range of motion is encouraged.  The patient will be seen back in 10-14 days for a wound check and suture removal.

## 2019-12-19 NOTE — OR NURSING
[facility]  [facility]  [address]  [address] FLOOR  [facility] [address]  [phone]  [phone]    2019    Reymundo Joseph  [address] [address]  [phone] (home) [phone] (work)    :  1975    To Whom it May Concern:    Reymundo Joseph had a Right Open Carpal Tunnel Release on 2019. She is not to lift greater than 5 pounds until incision has healed which will likely be 3 weeks.     Please contact me for any questions or concerns.    Sincerely,      Dr. Jeffrey Preciado

## 2020-01-06 ENCOUNTER — OFFICE VISIT (OUTPATIENT)
Dept: ORTHOPEDICS | Facility: CLINIC | Age: 45
End: 2020-01-06
Payer: COMMERCIAL

## 2020-01-06 DIAGNOSIS — G56.01 RIGHT CARPAL TUNNEL SYNDROME: Primary | ICD-10-CM

## 2020-01-06 NOTE — PROGRESS NOTES
Reason for visit:    Reymundo Joseph came in to the clinic for a two week post op check.    Her surgery was done 12/19/19 by Dr Preciado.  She had right open carpal tunnel release.     Assessment:    The Surgical wounds were exposed and found to be well-healed; so the sutures were removed.    Plan:     She was placed in a band aid.  She was told to keep the area clean and dry, not to soak or submerge her hand in water, not to apply any lotions, ointments, or creams over the area, and not to lift anything greater than 5 lbs.     She has an appointment to see Dr. Preciado at that time Dr. Preciado will determine further restrictions.    She has our phone number and will call with questions or problems.

## 2020-06-04 ENCOUNTER — VIRTUAL VISIT (OUTPATIENT)
Dept: FAMILY MEDICINE | Facility: OTHER | Age: 45
End: 2020-06-04

## 2020-06-04 NOTE — PROGRESS NOTES
"Date: 2020 18:28:35  Clinician: Reyna Miller  Clinician NPI: [id number]  Patient: Reymundo Giron  Patient : 1975  Patient Address: [address] [place] [address], [place] [address]  Patient Phone: [phone]  Visit Protocol: Yeast infection  Patient Summary:  Reymundo is a 44 year old ( : 1975 ) female who initiated a Visit for a presumed vaginal yeast infection. When asked the question \"Please sign me up to receive news, health information and promotions. \", Reymundo responded \"No\".    Reymundo began noticing vaginal burning, vaginal irritation, vaginal discharge, and vaginal pruritus 1-3 days ago.   Symptom details   Vaginal discharge: She has a more than normal amount of white, chunky (like cottage cheese) discharge. The discharge does not have a fishy smell.    She denies having abdominal pain, blisters, and open sores. She also denies feeling feverish.   She has not tried to treat her current symptoms with any medication.   Precipitating events  Reymundo denies having a sexually transmitted disease.   Pertinent medical history  Reymundo has a history of vaginal yeast infections. She has had one (1) occurrence in the past year and the current symptoms are similar to previous yeast infections. She has used fluconazole (Diflucan) to treat previous yeast infections. 2 doses of fluconazole (Diflucan) has typically been needed for symptoms to resolve in the past.  She prefers a pill. She denies taking antibiotics in the past 2 weeks.   She does NOT smoke or use smokeless tobacco.   She denies pregnancy and denies breastfeeding. She has menstruated in the past month.      MEDICATIONS: Women's One Daily oral, cholecalciferol (vitamin D3) oral, Omega-3 Fish Oil oral, ibuprofen oral, ALLERGIES: NKDA  Clinician Response:  Dear Reymundo,  Based on the information you have provided, you likely have a vaginal yeast infection which is a common infection of the vagina caused by a fungus. Yeast are a type of " fungus.  The most common symptom of a yeast infection is itching in and around the vagina. Other signs and symptoms include burning, redness, or a thick, white vaginal discharge that looks like cottage cheese and does not have a bad smell.  Medication information  I am prescribing:     Fluconazole (Diflucan) 150 mg oral tablet. Take 1 tablet by mouth in a single dose. Repeat dose in 3 days if symptoms are still present. There are no refills with this prescription.   Self care  Steps you can take to prevent symptoms of future vaginal yeast infection:     Avoid irritants such as scented bath products, tampons, pads, or vaginal sprays and powders    Avoid douching    Wear cotton underwear and if you are comfortable doing so, do not wear underwear to bed    Avoid hot tubs and whirlpool spas     When to seek care  Most women notice improvement in their symptoms within 1-2 days after starting treatment with complete clearing in 5-7 days.  Please make an appointment to be seen in a clinic or urgent care if any of the following occur:     Your symptoms have not improved in 3 days or not resolved in 10 days    You develop new symptoms or your symptoms become worse    If you think you may be at risk for an STD      Diagnosis: Candida vulvovaginitis  Diagnosis ICD: B37.3  Prescription: fluconazole (Diflucan) 150 mg oral tablet 2 tablet, 4 days supply. Take 1 tablet by mouth in a single dose, repeat dose in 3 days if symptoms are still present. Refills: 0, Refill as needed: no, Allow substitutions: yes  Pharmacy: [place] [id number] [facility] [employer] - [phone] - [address], [place] [address]

## 2020-06-10 NOTE — PROGRESS NOTES
Subjective     Reymundo Joseph is a 44 year old female who presents to clinic today for the following health issues:    HPI   Anxiety Follow-Up    How are you doing with your anxiety since your last visit? Worsened     Are you having other symptoms that might be associated with anxiety? Yes:  insomnia    Have you had a significant life event? No     Are you feeling depressed? No    Do you have any concerns with your use of alcohol or other drugs? No    Social History     Tobacco Use     Smoking status: Former Smoker     Packs/day: 0.50     Types: Cigarettes     Last attempt to quit: 2006     Years since quittin.0     Smokeless tobacco: Former User   Substance Use Topics     Alcohol use: Yes     Alcohol/week: 2.5 - 3.3 standard drinks     Drug use: No     EILEEN-7 SCORE 2014 2015 3/2/2017   Total Score 5 0 -   Total Score - - 1     PHQ 2016 3/2/2017   PHQ-9 Total Score 0 3   Q9: Thoughts of better off dead/self-harm past 2 weeks Not at all Not at all     Last PHQ-9 3/2/2017   1.  Little interest or pleasure in doing things 0   2.  Feeling down, depressed, or hopeless 0   3.  Trouble falling or staying asleep, or sleeping too much 1   4.  Feeling tired or having little energy 0   5.  Poor appetite or overeating 1   6.  Feeling bad about yourself 0   7.  Trouble concentrating 1   8.  Moving slowly or restless 0   Q9: Thoughts of better off dead/self-harm past 2 weeks 0   PHQ-9 Total Score 3     EILEEN-7  3/2/2017   1. Feeling nervous, anxious, or on edge 0   2. Not being able to stop or control worrying 0   3. Worrying too much about different things 1   4. Trouble relaxing 0   5. Being so restless that it is hard to sit still 0   6. Becoming easily annoyed or irritable 0   7. Feeling afraid, as if something awful might happen 0   EILEEN-7 Total Score 1         How many servings of fruits and vegetables do you eat daily?  2-3    On average, how many sweetened beverages do you drink each day  (Examples: soda, juice, sweet tea, etc.  Do NOT count diet or artificially sweetened beverages)?   0    How many days per week do you exercise enough to make your heart beat faster? 7    How many minutes a day do you exercise enough to make your heart beat faster? 30 - 60    How many days per week do you miss taking your medication? 0    Vane Samayoa CMA      Patient Active Problem List   Diagnosis     Hyperthyroidism     CARDIOVASCULAR SCREENING; LDL GOAL LESS THAN 160     ASC-H on pap smear     Family history of other condition     Adjustment disorder with anxiety     24 hour contact info given     Over weight     Vitamin D deficiency     Right carpal tunnel syndrome     Past Surgical History:   Procedure Laterality Date     C NONSPECIFIC PROCEDURE      Tonsillectomy      SECTION  2011    Procedure: SECTION; Surgeon:CHIKIS CLINE; Location:[facility] L+D     RELEASE CARPAL TUNNEL Right 2019    Procedure: Right Open carpal tunnel release;  Surgeon: Jeffrey Preciado MD;  Location:  OR       Social History     Tobacco Use     Smoking status: Former Smoker     Packs/day: 0.50     Types: Cigarettes     Last attempt to quit: 2006     Years since quittin.0     Smokeless tobacco: Former User   Substance Use Topics     Alcohol use: Yes     Alcohol/week: 2.5 - 3.3 standard drinks     Family History   Problem Relation Age of Onset     Neurologic Disorder Mother         migraines, headaches     Thyroid Disease Mother      Hypertension Mother      Lipids Mother         +     Thyroid Disease Maternal Grandmother      Cancer Maternal Grandmother      Psychotic Disorder Maternal Grandmother      Depression Maternal Grandmother      Heart Disease Maternal Grandfather      Hypertension Maternal Grandfather      Thyroid Disease Sister      Osteoporosis Sister          Current Outpatient Medications   Medication Sig Dispense Refill     cyclobenzaprine (FLEXERIL) 10 MG tablet Take 1  tablet (10 mg) by mouth nightly as needed for muscle spasms Take ONLY if needed 30 tablet 1     Multiple Vitamins-Minerals (MULTIVITAMIN ADULT PO)        Omega-3 Fatty Acids (FISH OIL PO) Take 2,000 mg by mouth       VITAMIN D, CHOLECALCIFEROL, PO Take 2,000 Units by mouth daily       ibuprofen (ADVIL,MOTRIN) 200 MG tablet Take 200 mg by mouth Reported on 3/2/2017       Allergies   Allergen Reactions     No Known Drug Allergies      BP Readings from Last 3 Encounters:   06/11/20 122/68   12/19/19 (!) 124/96   11/14/19 127/73    Wt Readings from Last 3 Encounters:   06/11/20 79.4 kg (175 lb)   12/19/19 70.3 kg (155 lb)   11/14/19 78 kg (172 lb)                      Reviewed and updated as needed this visit by Provider         Review of Systems   CONSTITUTIONAL: NEGATIVE for fever, chills, change in weight  ENT/MOUTH: NEGATIVE for ear, mouth and throat problems  RESP: NEGATIVE for significant cough or SOB  CV: NEGATIVE for chest pain, palpitations or peripheral edema  PSYCHIATRIC: POSITIVE for anxiety, insomnia is having some difficulty sleeping  and stress off of medication several months ago.  Was doing OK until after  COVID and [facility] ,       Not sleeping well.  Mind will think about everything       Objective    /68   Pulse 72   Temp 98.7  F (37.1  C) (Tympanic)   Resp 15   Wt 79.4 kg (175 lb)   Breastfeeding No   BMI 30.04 kg/m    Body mass index is 30.04 kg/m .  Physical Exam   GENERAL: healthy, alert and no distress  RESP: lungs clear to auscultation - no rales, rhonchi or wheezes  CV: regular rate and rhythm, normal S1 S2, no S3 or S4, no murmur, click or rub, no peripheral edema and peripheral pulses strong  PSYCH: mentation appears normal, affect normal/bright, anxious, judgement and insight intact and appearance well groomed  Her  EILEEN 7 . Is very aware of her anxiety  And  How her sleep disruption is affecting her over all mental health   Diagnostic Test Results:  Labs reviewed in  Epic  none         ASSESSMENT/PLAN:      ICD-10-CM    1. Adjustment disorder with anxiety  F43.22 sertraline (ZOLOFT) 50 MG tablet   2. Sleep disorder  G47.9 traZODone (DESYREL) 50 MG tablet       Patient Instructions   Will restart the  zoloft       For sleep. Start the  Trazodone  Take nightly for  7-10  Nights then see if you can fall asleep on your own.   Tell yourself that you are going to fall a sleep,  Sleep all night and wake rested in the morning,  Tell this to yourself at least 8 times before going to bed.  Do your deep breathing  Breath in to the count of 3, hold your breath to the count of 3 and out to the count of 3.  Repeat this 2-3 times.    If you wake during the night repeat this.  Write down your thoughts before you go to bed and tell yourself that you know where the information is, you can get it when you need it and you don't have to worry about it now.

## 2020-06-11 ENCOUNTER — OFFICE VISIT (OUTPATIENT)
Dept: FAMILY MEDICINE | Facility: CLINIC | Age: 45
End: 2020-06-11
Payer: COMMERCIAL

## 2020-06-11 VITALS
WEIGHT: 175 LBS | DIASTOLIC BLOOD PRESSURE: 68 MMHG | RESPIRATION RATE: 15 BRPM | BODY MASS INDEX: 30.04 KG/M2 | HEART RATE: 72 BPM | TEMPERATURE: 98.7 F | SYSTOLIC BLOOD PRESSURE: 122 MMHG

## 2020-06-11 DIAGNOSIS — G47.9 SLEEP DISORDER: ICD-10-CM

## 2020-06-11 DIAGNOSIS — F43.22 ADJUSTMENT DISORDER WITH ANXIETY: Primary | ICD-10-CM

## 2020-06-11 PROCEDURE — 99214 OFFICE O/P EST MOD 30 MIN: CPT | Performed by: NURSE PRACTITIONER

## 2020-06-11 RX ORDER — TRAZODONE HYDROCHLORIDE 50 MG/1
50 TABLET, FILM COATED ORAL AT BEDTIME
Qty: 30 TABLET | Refills: 2 | Status: SHIPPED | OUTPATIENT
Start: 2020-06-11 | End: 2020-08-04

## 2020-06-11 ASSESSMENT — ANXIETY QUESTIONNAIRES
5. BEING SO RESTLESS THAT IT IS HARD TO SIT STILL: NEARLY EVERY DAY
GAD7 TOTAL SCORE: 21
7. FEELING AFRAID AS IF SOMETHING AWFUL MIGHT HAPPEN: NEARLY EVERY DAY
2. NOT BEING ABLE TO STOP OR CONTROL WORRYING: NEARLY EVERY DAY
6. BECOMING EASILY ANNOYED OR IRRITABLE: NEARLY EVERY DAY
3. WORRYING TOO MUCH ABOUT DIFFERENT THINGS: NEARLY EVERY DAY
1. FEELING NERVOUS, ANXIOUS, OR ON EDGE: NEARLY EVERY DAY
IF YOU CHECKED OFF ANY PROBLEMS ON THIS QUESTIONNAIRE, HOW DIFFICULT HAVE THESE PROBLEMS MADE IT FOR YOU TO DO YOUR WORK, TAKE CARE OF THINGS AT HOME, OR GET ALONG WITH OTHER PEOPLE: VERY DIFFICULT

## 2020-06-11 ASSESSMENT — PATIENT HEALTH QUESTIONNAIRE - PHQ9
5. POOR APPETITE OR OVEREATING: NEARLY EVERY DAY
SUM OF ALL RESPONSES TO PHQ QUESTIONS 1-9: 9

## 2020-06-11 NOTE — PATIENT INSTRUCTIONS
Will restart the  zoloft       For sleep. Start the  Trazodone  Take nightly for  7-10  Nights then see if you can fall asleep on your own.   Tell yourself that you are going to fall a sleep,  Sleep all night and wake rested in the morning,  Tell this to yourself at least 8 times before going to bed.  Do your deep breathing  Breath in to the count of 3, hold your breath to the count of 3 and out to the count of 3.  Repeat this 2-3 times.    If you wake during the night repeat this.  Write down your thoughts before you go to bed and tell yourself that you know where the information is, you can get it when you need it and you don't have to worry about it now.

## 2020-06-12 ASSESSMENT — ANXIETY QUESTIONNAIRES: GAD7 TOTAL SCORE: 21

## 2020-08-03 DIAGNOSIS — G47.9 SLEEP DISORDER: ICD-10-CM

## 2020-08-04 RX ORDER — TRAZODONE HYDROCHLORIDE 50 MG/1
TABLET, FILM COATED ORAL
Qty: 30 TABLET | Refills: 2 | Status: SHIPPED | OUTPATIENT
Start: 2020-08-04 | End: 2020-11-13

## 2020-11-22 ENCOUNTER — HEALTH MAINTENANCE LETTER (OUTPATIENT)
Age: 45
End: 2020-11-22

## 2021-01-15 ENCOUNTER — HEALTH MAINTENANCE LETTER (OUTPATIENT)
Age: 46
End: 2021-01-15

## 2021-09-19 ENCOUNTER — HEALTH MAINTENANCE LETTER (OUTPATIENT)
Age: 46
End: 2021-09-19

## 2022-01-09 ENCOUNTER — HEALTH MAINTENANCE LETTER (OUTPATIENT)
Age: 47
End: 2022-01-09

## 2022-03-06 ENCOUNTER — HEALTH MAINTENANCE LETTER (OUTPATIENT)
Age: 47
End: 2022-03-06

## 2022-11-21 ENCOUNTER — HEALTH MAINTENANCE LETTER (OUTPATIENT)
Age: 47
End: 2022-11-21

## 2023-04-16 ENCOUNTER — HEALTH MAINTENANCE LETTER (OUTPATIENT)
Age: 48
End: 2023-04-16

## 2024-08-22 ENCOUNTER — LAB REQUISITION (OUTPATIENT)
Dept: LAB | Facility: CLINIC | Age: 49
End: 2024-08-22

## 2024-08-22 DIAGNOSIS — N95.1 MENOPAUSAL AND FEMALE CLIMACTERIC STATES: ICD-10-CM

## 2024-08-22 LAB
BASOPHILS # BLD AUTO: 0 10E3/UL (ref 0–0.2)
BASOPHILS NFR BLD AUTO: 1 %
EOSINOPHIL # BLD AUTO: 0.1 10E3/UL (ref 0–0.7)
EOSINOPHIL NFR BLD AUTO: 3 %
ERYTHROCYTE [DISTWIDTH] IN BLOOD BY AUTOMATED COUNT: 12.6 % (ref 10–15)
ESTRADIOL SERPL-MCNC: 20 PG/ML
FSH SERPL IRP2-ACNC: 43.9 MIU/ML
HCT VFR BLD AUTO: 43 % (ref 35–47)
HGB BLD-MCNC: 14.4 G/DL (ref 11.7–15.7)
IMM GRANULOCYTES # BLD: 0 10E3/UL
IMM GRANULOCYTES NFR BLD: 0 %
LYMPHOCYTES # BLD AUTO: 1.2 10E3/UL (ref 0.8–5.3)
LYMPHOCYTES NFR BLD AUTO: 23 %
MCH RBC QN AUTO: 29.6 PG (ref 26.5–33)
MCHC RBC AUTO-ENTMCNC: 33.5 G/DL (ref 31.5–36.5)
MCV RBC AUTO: 88 FL (ref 78–100)
MONOCYTES # BLD AUTO: 0.4 10E3/UL (ref 0–1.3)
MONOCYTES NFR BLD AUTO: 7 %
NEUTROPHILS # BLD AUTO: 3.3 10E3/UL (ref 1.6–8.3)
NEUTROPHILS NFR BLD AUTO: 66 %
NRBC # BLD AUTO: 0 10E3/UL
NRBC BLD AUTO-RTO: 0 /100
PLATELET # BLD AUTO: 289 10E3/UL (ref 150–450)
RBC # BLD AUTO: 4.87 10E6/UL (ref 3.8–5.2)
TSH SERPL DL<=0.005 MIU/L-ACNC: 1.73 UIU/ML (ref 0.3–4.2)
WBC # BLD AUTO: 5 10E3/UL (ref 4–11)

## 2024-08-22 PROCEDURE — 85025 COMPLETE CBC W/AUTO DIFF WBC: CPT | Performed by: OBSTETRICS & GYNECOLOGY

## 2024-08-22 PROCEDURE — 84443 ASSAY THYROID STIM HORMONE: CPT | Performed by: OBSTETRICS & GYNECOLOGY

## 2024-08-22 PROCEDURE — 82670 ASSAY OF TOTAL ESTRADIOL: CPT | Performed by: OBSTETRICS & GYNECOLOGY

## 2024-08-22 PROCEDURE — 83001 ASSAY OF GONADOTROPIN (FSH): CPT | Performed by: OBSTETRICS & GYNECOLOGY

## (undated) DEVICE — BRUSH SURGICAL SCRUB W/4% CHG SOL 25ML 371073

## (undated) DEVICE — DRAPE STERI TOWEL LG 1010

## (undated) DEVICE — GLOVE PROTEXIS BLUE W/NEU-THERA 6.5  2D73EB65

## (undated) DEVICE — BNDG KLING 2" 2231

## (undated) DEVICE — GLOVE PROTEXIS POWDER FREE SMT 6.5  2D72PT65X

## (undated) DEVICE — TOURNIQUET SGL BLADDER 18"X5.5" RED 5921-018-145

## (undated) DEVICE — PACK HAND CUSTOM ASC

## (undated) DEVICE — SOL NACL 0.9% IRRIG 500ML BOTTLE 2F7123

## (undated) DEVICE — PREP CHLORAPREP 26ML TINTED ORANGE  260815

## (undated) DEVICE — Device

## (undated) DEVICE — SU ETHILON 4-0 PS-2 18" BLACK 1667H

## (undated) DEVICE — LINEN ORTHO PACK 5446

## (undated) RX ORDER — LIDOCAINE HYDROCHLORIDE AND EPINEPHRINE 10; 10 MG/ML; UG/ML
INJECTION, SOLUTION INFILTRATION; PERINEURAL
Status: DISPENSED
Start: 2019-12-19